# Patient Record
Sex: FEMALE | Race: WHITE | NOT HISPANIC OR LATINO
[De-identification: names, ages, dates, MRNs, and addresses within clinical notes are randomized per-mention and may not be internally consistent; named-entity substitution may affect disease eponyms.]

---

## 2017-03-29 ENCOUNTER — TRANSCRIPTION ENCOUNTER (OUTPATIENT)
Age: 27
End: 2017-03-29

## 2017-10-11 ENCOUNTER — TRANSCRIPTION ENCOUNTER (OUTPATIENT)
Age: 27
End: 2017-10-11

## 2020-12-10 ENCOUNTER — INPATIENT (INPATIENT)
Facility: HOSPITAL | Age: 30
LOS: 2 days | Discharge: HOME | DRG: 98 | End: 2020-12-13
Attending: INTERNAL MEDICINE | Admitting: HOSPITALIST
Payer: COMMERCIAL

## 2020-12-10 VITALS
HEIGHT: 67 IN | OXYGEN SATURATION: 100 % | TEMPERATURE: 100 F | SYSTOLIC BLOOD PRESSURE: 91 MMHG | HEART RATE: 110 BPM | RESPIRATION RATE: 20 BRPM | WEIGHT: 156.97 LBS | DIASTOLIC BLOOD PRESSURE: 56 MMHG

## 2020-12-10 DIAGNOSIS — G03.9 MENINGITIS, UNSPECIFIED: ICD-10-CM

## 2020-12-10 LAB
ALBUMIN SERPL ELPH-MCNC: 4.2 G/DL — SIGNIFICANT CHANGE UP (ref 3.3–5.2)
ALP SERPL-CCNC: 37 U/L — LOW (ref 40–120)
ALT FLD-CCNC: 12 U/L — SIGNIFICANT CHANGE UP
ANION GAP SERPL CALC-SCNC: 15 MMOL/L — SIGNIFICANT CHANGE UP (ref 5–17)
ANISOCYTOSIS BLD QL: SLIGHT — SIGNIFICANT CHANGE UP
APPEARANCE CSF: CLEAR — SIGNIFICANT CHANGE UP
APPEARANCE SPUN FLD: COLORLESS — SIGNIFICANT CHANGE UP
APPEARANCE UR: CLEAR — SIGNIFICANT CHANGE UP
AST SERPL-CCNC: 22 U/L — SIGNIFICANT CHANGE UP
BACTERIA # UR AUTO: ABNORMAL
BASOPHILS # BLD AUTO: 0 K/UL — SIGNIFICANT CHANGE UP (ref 0–0.2)
BASOPHILS NFR BLD AUTO: 0 % — SIGNIFICANT CHANGE UP (ref 0–2)
BILIRUB SERPL-MCNC: 0.7 MG/DL — SIGNIFICANT CHANGE UP (ref 0.4–2)
BILIRUB UR-MCNC: NEGATIVE — SIGNIFICANT CHANGE UP
BUN SERPL-MCNC: 17 MG/DL — SIGNIFICANT CHANGE UP (ref 8–20)
CALCIUM SERPL-MCNC: 9.1 MG/DL — SIGNIFICANT CHANGE UP (ref 8.6–10.2)
CHLORIDE SERPL-SCNC: 99 MMOL/L — SIGNIFICANT CHANGE UP (ref 98–107)
CO2 SERPL-SCNC: 21 MMOL/L — LOW (ref 22–29)
COLOR CSF: SIGNIFICANT CHANGE UP
COLOR SPEC: YELLOW — SIGNIFICANT CHANGE UP
CREAT SERPL-MCNC: 0.81 MG/DL — SIGNIFICANT CHANGE UP (ref 0.5–1.3)
D DIMER BLD IA.RAPID-MCNC: 153 NG/ML DDU — SIGNIFICANT CHANGE UP
DIFF PNL FLD: ABNORMAL
EBV EA AB SER IA-ACNC: <5 U/ML — SIGNIFICANT CHANGE UP
EBV EA AB TITR SER IF: POSITIVE
EBV EA IGG SER-ACNC: NEGATIVE — SIGNIFICANT CHANGE UP
EBV NA IGG SER IA-ACNC: >600 U/ML — HIGH
EBV PATRN SPEC IB-IMP: SIGNIFICANT CHANGE UP
EBV VCA IGG AVIDITY SER QL IA: POSITIVE
EBV VCA IGM SER IA-ACNC: 291 U/ML — HIGH
EBV VCA IGM SER IA-ACNC: <10 U/ML — SIGNIFICANT CHANGE UP
EBV VCA IGM TITR FLD: NEGATIVE — SIGNIFICANT CHANGE UP
EOSINOPHIL # BLD AUTO: 0 K/UL — SIGNIFICANT CHANGE UP (ref 0–0.5)
EOSINOPHIL NFR BLD AUTO: 0 % — SIGNIFICANT CHANGE UP (ref 0–6)
EPI CELLS # UR: SIGNIFICANT CHANGE UP
FLU A RESULT: SIGNIFICANT CHANGE UP
FLU A RESULT: SIGNIFICANT CHANGE UP
FLUAV AG NPH QL: SIGNIFICANT CHANGE UP
FLUBV AG NPH QL: SIGNIFICANT CHANGE UP
GLUCOSE BLDC GLUCOMTR-MCNC: 111 MG/DL — HIGH (ref 70–99)
GLUCOSE CSF-MCNC: 67 MG/DLG/24H — SIGNIFICANT CHANGE UP (ref 40–70)
GLUCOSE SERPL-MCNC: 123 MG/DL — HIGH (ref 70–99)
GLUCOSE UR QL: NEGATIVE MG/DL — SIGNIFICANT CHANGE UP
HCG SERPL-ACNC: <4 MIU/ML — SIGNIFICANT CHANGE UP
HCT VFR BLD CALC: 34.6 % — SIGNIFICANT CHANGE UP (ref 34.5–45)
HGB BLD-MCNC: 11.8 G/DL — SIGNIFICANT CHANGE UP (ref 11.5–15.5)
HIV 1 & 2 AB SERPL IA.RAPID: SIGNIFICANT CHANGE UP
KETONES UR-MCNC: NEGATIVE — SIGNIFICANT CHANGE UP
LEUKOCYTE ESTERASE UR-ACNC: NEGATIVE — SIGNIFICANT CHANGE UP
LYMPHOCYTES # BLD AUTO: 0.24 K/UL — LOW (ref 1–3.3)
LYMPHOCYTES # BLD AUTO: 0.9 % — LOW (ref 13–44)
MANUAL SMEAR VERIFICATION: SIGNIFICANT CHANGE UP
MCHC RBC-ENTMCNC: 29.6 PG — SIGNIFICANT CHANGE UP (ref 27–34)
MCHC RBC-ENTMCNC: 34.1 GM/DL — SIGNIFICANT CHANGE UP (ref 32–36)
MCV RBC AUTO: 86.7 FL — SIGNIFICANT CHANGE UP (ref 80–100)
METAMYELOCYTES # FLD: 2.6 % — HIGH (ref 0–0)
MICROCYTES BLD QL: SLIGHT — SIGNIFICANT CHANGE UP
MONOCYTES # BLD AUTO: 0 K/UL — SIGNIFICANT CHANGE UP (ref 0–0.9)
MONOCYTES NFR BLD AUTO: 0 % — LOW (ref 2–14)
MONOS+MACROS NFR CSF: 1 % — LOW (ref 15–45)
MYELOCYTES NFR BLD: 0.9 % — HIGH (ref 0–0)
NEUTROPHILS # BLD AUTO: 25.74 K/UL — HIGH (ref 1.8–7.4)
NEUTROPHILS # CSF: 99 % — HIGH (ref 0–6)
NEUTROPHILS NFR BLD AUTO: 91.3 % — HIGH (ref 43–77)
NEUTS BAND # BLD: 4.3 % — SIGNIFICANT CHANGE UP (ref 0–8)
NITRITE UR-MCNC: NEGATIVE — SIGNIFICANT CHANGE UP
NRBC NFR CSF: 206 /UL — HIGH (ref 0–5)
OVALOCYTES BLD QL SMEAR: SLIGHT — SIGNIFICANT CHANGE UP
PH UR: 6 — SIGNIFICANT CHANGE UP (ref 5–8)
PLAT MORPH BLD: NORMAL — SIGNIFICANT CHANGE UP
PLATELET # BLD AUTO: 259 K/UL — SIGNIFICANT CHANGE UP (ref 150–400)
POTASSIUM SERPL-MCNC: 3.5 MMOL/L — SIGNIFICANT CHANGE UP (ref 3.5–5.3)
POTASSIUM SERPL-SCNC: 3.5 MMOL/L — SIGNIFICANT CHANGE UP (ref 3.5–5.3)
PROT CSF-MCNC: 29 MG/DL — SIGNIFICANT CHANGE UP (ref 15–45)
PROT SERPL-MCNC: 6.7 G/DL — SIGNIFICANT CHANGE UP (ref 6.6–8.7)
PROT UR-MCNC: NEGATIVE MG/DL — SIGNIFICANT CHANGE UP
RBC # BLD: 3.99 M/UL — SIGNIFICANT CHANGE UP (ref 3.8–5.2)
RBC # CSF: 1 /CMM — SIGNIFICANT CHANGE UP (ref 0–1)
RBC # FLD: 12.3 % — SIGNIFICANT CHANGE UP (ref 10.3–14.5)
RBC BLD AUTO: SIGNIFICANT CHANGE UP
RBC CASTS # UR COMP ASSIST: ABNORMAL /HPF (ref 0–4)
RSV RESULT: SIGNIFICANT CHANGE UP
RSV RNA RESP QL NAA+PROBE: SIGNIFICANT CHANGE UP
SARS-COV-2 RNA SPEC QL NAA+PROBE: SIGNIFICANT CHANGE UP
SODIUM SERPL-SCNC: 134 MMOL/L — LOW (ref 135–145)
SP GR SPEC: 1.01 — SIGNIFICANT CHANGE UP (ref 1.01–1.02)
SPECIMEN SOURCE: SIGNIFICANT CHANGE UP
TROPONIN T SERPL-MCNC: <0.01 NG/ML — SIGNIFICANT CHANGE UP (ref 0–0.06)
TUBE TYPE: SIGNIFICANT CHANGE UP
UROBILINOGEN FLD QL: NEGATIVE MG/DL — SIGNIFICANT CHANGE UP
WBC # BLD: 26.92 K/UL — HIGH (ref 3.8–10.5)
WBC # FLD AUTO: 26.92 K/UL — HIGH (ref 3.8–10.5)
WBC UR QL: SIGNIFICANT CHANGE UP

## 2020-12-10 PROCEDURE — 99223 1ST HOSP IP/OBS HIGH 75: CPT

## 2020-12-10 PROCEDURE — 70450 CT HEAD/BRAIN W/O DYE: CPT | Mod: 26

## 2020-12-10 PROCEDURE — 71046 X-RAY EXAM CHEST 2 VIEWS: CPT | Mod: 26

## 2020-12-10 PROCEDURE — 99285 EMERGENCY DEPT VISIT HI MDM: CPT

## 2020-12-10 RX ORDER — SODIUM CHLORIDE 9 MG/ML
1000 INJECTION INTRAMUSCULAR; INTRAVENOUS; SUBCUTANEOUS ONCE
Refills: 0 | Status: COMPLETED | OUTPATIENT
Start: 2020-12-10 | End: 2020-12-10

## 2020-12-10 RX ORDER — MORPHINE SULFATE 50 MG/1
2 CAPSULE, EXTENDED RELEASE ORAL EVERY 4 HOURS
Refills: 0 | Status: DISCONTINUED | OUTPATIENT
Start: 2020-12-10 | End: 2020-12-13

## 2020-12-10 RX ORDER — CEFTRIAXONE 500 MG/1
2000 INJECTION, POWDER, FOR SOLUTION INTRAMUSCULAR; INTRAVENOUS ONCE
Refills: 0 | Status: COMPLETED | OUTPATIENT
Start: 2020-12-10 | End: 2020-12-10

## 2020-12-10 RX ORDER — CABERGOLINE 0.5 MG/1
0.5 TABLET ORAL
Qty: 0 | Refills: 0 | DISCHARGE

## 2020-12-10 RX ORDER — VANCOMYCIN HCL 1 G
1250 VIAL (EA) INTRAVENOUS EVERY 12 HOURS
Refills: 0 | Status: DISCONTINUED | OUTPATIENT
Start: 2020-12-11 | End: 2020-12-11

## 2020-12-10 RX ORDER — METOCLOPRAMIDE HCL 10 MG
10 TABLET ORAL ONCE
Refills: 0 | Status: DISCONTINUED | OUTPATIENT
Start: 2020-12-10 | End: 2020-12-10

## 2020-12-10 RX ORDER — ACETAMINOPHEN 500 MG
650 TABLET ORAL EVERY 6 HOURS
Refills: 0 | Status: DISCONTINUED | OUTPATIENT
Start: 2020-12-10 | End: 2020-12-13

## 2020-12-10 RX ORDER — ONDANSETRON 8 MG/1
4 TABLET, FILM COATED ORAL ONCE
Refills: 0 | Status: COMPLETED | OUTPATIENT
Start: 2020-12-10 | End: 2020-12-10

## 2020-12-10 RX ORDER — METFORMIN HYDROCHLORIDE 850 MG/1
3 TABLET ORAL
Qty: 0 | Refills: 0 | DISCHARGE

## 2020-12-10 RX ORDER — VANCOMYCIN HCL 1 G
1000 VIAL (EA) INTRAVENOUS ONCE
Refills: 0 | Status: DISCONTINUED | OUTPATIENT
Start: 2020-12-10 | End: 2020-12-10

## 2020-12-10 RX ORDER — DIPHENHYDRAMINE HCL 50 MG
25 CAPSULE ORAL ONCE
Refills: 0 | Status: COMPLETED | OUTPATIENT
Start: 2020-12-10 | End: 2020-12-10

## 2020-12-10 RX ORDER — NYSTATIN 500MM UNIT
500000 POWDER (EA) MISCELLANEOUS
Refills: 0 | Status: DISCONTINUED | OUTPATIENT
Start: 2020-12-10 | End: 2020-12-13

## 2020-12-10 RX ORDER — VANCOMYCIN HCL 1 G
1250 VIAL (EA) INTRAVENOUS ONCE
Refills: 0 | Status: COMPLETED | OUTPATIENT
Start: 2020-12-10 | End: 2020-12-10

## 2020-12-10 RX ORDER — VANCOMYCIN HCL 1 G
VIAL (EA) INTRAVENOUS
Refills: 0 | Status: DISCONTINUED | OUTPATIENT
Start: 2020-12-10 | End: 2020-12-11

## 2020-12-10 RX ORDER — MORPHINE SULFATE 50 MG/1
4 CAPSULE, EXTENDED RELEASE ORAL ONCE
Refills: 0 | Status: DISCONTINUED | OUTPATIENT
Start: 2020-12-10 | End: 2020-12-10

## 2020-12-10 RX ORDER — KETOROLAC TROMETHAMINE 30 MG/ML
15 SYRINGE (ML) INJECTION ONCE
Refills: 0 | Status: DISCONTINUED | OUTPATIENT
Start: 2020-12-10 | End: 2020-12-10

## 2020-12-10 RX ORDER — VANCOMYCIN HCL 1 G
VIAL (EA) INTRAVENOUS
Refills: 0 | Status: DISCONTINUED | OUTPATIENT
Start: 2020-12-10 | End: 2020-12-10

## 2020-12-10 RX ORDER — ONDANSETRON 8 MG/1
4 TABLET, FILM COATED ORAL EVERY 6 HOURS
Refills: 0 | Status: DISCONTINUED | OUTPATIENT
Start: 2020-12-10 | End: 2020-12-13

## 2020-12-10 RX ORDER — METOCLOPRAMIDE HCL 10 MG
10 TABLET ORAL ONCE
Refills: 0 | Status: COMPLETED | OUTPATIENT
Start: 2020-12-10 | End: 2020-12-10

## 2020-12-10 RX ORDER — CEFTRIAXONE 500 MG/1
2000 INJECTION, POWDER, FOR SOLUTION INTRAMUSCULAR; INTRAVENOUS EVERY 12 HOURS
Refills: 0 | Status: DISCONTINUED | OUTPATIENT
Start: 2020-12-10 | End: 2020-12-11

## 2020-12-10 RX ORDER — ACYCLOVIR SODIUM 500 MG
700 VIAL (EA) INTRAVENOUS EVERY 8 HOURS
Refills: 0 | Status: DISCONTINUED | OUTPATIENT
Start: 2020-12-10 | End: 2020-12-11

## 2020-12-10 RX ORDER — SODIUM CHLORIDE 9 MG/ML
1000 INJECTION, SOLUTION INTRAVENOUS ONCE
Refills: 0 | Status: COMPLETED | OUTPATIENT
Start: 2020-12-10 | End: 2020-12-10

## 2020-12-10 RX ADMIN — Medication 166.67 MILLIGRAM(S): at 18:46

## 2020-12-10 RX ADMIN — SODIUM CHLORIDE 1000 MILLILITER(S): 9 INJECTION, SOLUTION INTRAVENOUS at 18:49

## 2020-12-10 RX ADMIN — ONDANSETRON 4 MILLIGRAM(S): 8 TABLET, FILM COATED ORAL at 08:28

## 2020-12-10 RX ADMIN — Medication 25 MILLIGRAM(S): at 11:16

## 2020-12-10 RX ADMIN — Medication 15 MILLIGRAM(S): at 11:19

## 2020-12-10 RX ADMIN — MORPHINE SULFATE 2 MILLIGRAM(S): 50 CAPSULE, EXTENDED RELEASE ORAL at 19:02

## 2020-12-10 RX ADMIN — ONDANSETRON 4 MILLIGRAM(S): 8 TABLET, FILM COATED ORAL at 21:06

## 2020-12-10 RX ADMIN — Medication 500000 UNIT(S): at 20:25

## 2020-12-10 RX ADMIN — CEFTRIAXONE 100 MILLIGRAM(S): 500 INJECTION, POWDER, FOR SOLUTION INTRAMUSCULAR; INTRAVENOUS at 20:25

## 2020-12-10 RX ADMIN — MORPHINE SULFATE 4 MILLIGRAM(S): 50 CAPSULE, EXTENDED RELEASE ORAL at 13:50

## 2020-12-10 RX ADMIN — Medication 114 MILLIGRAM(S): at 22:01

## 2020-12-10 RX ADMIN — MORPHINE SULFATE 4 MILLIGRAM(S): 50 CAPSULE, EXTENDED RELEASE ORAL at 14:20

## 2020-12-10 RX ADMIN — SODIUM CHLORIDE 1000 MILLILITER(S): 9 INJECTION INTRAMUSCULAR; INTRAVENOUS; SUBCUTANEOUS at 13:50

## 2020-12-10 RX ADMIN — Medication 10 MILLIGRAM(S): at 08:27

## 2020-12-10 RX ADMIN — CEFTRIAXONE 100 MILLIGRAM(S): 500 INJECTION, POWDER, FOR SOLUTION INTRAMUSCULAR; INTRAVENOUS at 14:52

## 2020-12-10 RX ADMIN — SODIUM CHLORIDE 1000 MILLILITER(S): 9 INJECTION INTRAMUSCULAR; INTRAVENOUS; SUBCUTANEOUS at 08:28

## 2020-12-10 RX ADMIN — MORPHINE SULFATE 2 MILLIGRAM(S): 50 CAPSULE, EXTENDED RELEASE ORAL at 19:28

## 2020-12-10 RX ADMIN — Medication 15 MILLIGRAM(S): at 11:50

## 2020-12-10 NOTE — ED STATDOCS - ATTENDING CONTRIBUTION TO CARE
30 YOF PMHx of insulin resistance on metformin, pituitary microadenoma followed by neurosurg/endocrine here for multiple complaints. Started on fluconazole yesterday by PCP for possible thrush x 1.5 weeks. After taking medication, patient felt flushed and vomited. Took benadryl with some relief but overnight started developing headaches. At first similar to migraines but then started to worsen. She recorded a temperature of 101 at home. This morning patient also with pleuritic chest pain. Tested negative for COVID19 recently.   AP - non meningeal on exam. ddimer negative and ekg nonischemic. after multiple rounds of pain meds, patient with only mild relief of headache and with elevated wbc to 27k, will do LP eval for meningitis. patient endorsing finding a possible tick on her a few weeks ago. will start abx, and admit.

## 2020-12-10 NOTE — ED ADULT NURSE REASSESSMENT NOTE - NS ED NURSE REASSESS COMMENT FT1
Pt awake reporting HA as returned and worsening findings reported to ACP and ED MD. Awaiting further orders.

## 2020-12-10 NOTE — ED ADULT TRIAGE NOTE - CHIEF COMPLAINT QUOTE
Pt took first dose of diflucan yesterday for oral thrush, states face and eyes became red, ate dinner and took prescribed metformin, began vomiting x 3 and headache/fever of 101.8, last took tylenol 230 am, tested for covid friday with negative result

## 2020-12-10 NOTE — ED STATDOCS - EYES, MLM
clear bilaterally.  Pupils equal, round, and reactive to light. EOMI clear bilaterally.  Pupils equal, round, and reactive to light. EOMI, non meningeal

## 2020-12-10 NOTE — ED STATDOCS - OBJECTIVE STATEMENT
31 y/o F with PMHx of insulin resistance, pituitary micro adenoma presents to the ED for multiple complaints. Pt states that she had oral thrush x2 days ago and had her first dose Diflucan yesterday and then she had flush in her face, vomit x3, fever (101.8). Pt then took benadryl and felt better. Pt had x1 week of headache which seemed positional with some photo sensitivity with it. This morning pt woke up with pleuritic CP, no history of DVT or PE. Pt on Metformin   Denies visual changes, diarrhea

## 2020-12-10 NOTE — ED ADULT NURSE REASSESSMENT NOTE - NS ED NURSE REASSESS COMMENT FT1
MD at pt bedside at this time, POC discussed with pt, pt verbalize understanding of plan and agrees, pt awaiting bed assignment at this time, safety and comfort maintained. Awaiting further orders.

## 2020-12-10 NOTE — ED STATDOCS - PROGRESS NOTE DETAILS
Patient notes improvement of headache with medication administration.   She reports in mid November she had a BV and yeast infection and was prescribed metrogel and diflucan that she took and had no reaction. She reports continued chest tightness that's worse when she takes a deep breath and dry mouth. patient reassessed, feeling that headache has dulled. still pending CTH and UA. elevated WBC noted, patient says chest pain improved, no abd pain. patient reassessed, feeling that headache has dulled. no meningeal signs. still pending CTH and UA. elevated WBC noted, patient says chest pain improved, no abd pain. Patient reassessed, notes headache has worsened, requesting medication.  Patient offered lumbar puncture, she would like to see if the medication she is getting for her headache might help before going through with lumbar puncture.

## 2020-12-10 NOTE — H&P ADULT - NSHPSOCIALHISTORY_GEN_ALL_CORE
Denied tobacco, alcohol, or illicit drug use  Lives at home by herself  PMH: Insulin resistance, Pituitary microadenoma  PSH: Denied  Family History: Father with hypertension, No history of diabetes

## 2020-12-10 NOTE — ED ADULT NURSE REASSESSMENT NOTE - NS ED NURSE REASSESS COMMENT FT1
Pt resting comfortably on stretcher, laying in flat comfortable position, safety and comfort maintained.

## 2020-12-10 NOTE — ED ADULT NURSE REASSESSMENT NOTE - NS ED NURSE REASSESS COMMENT FT1
Pt reproting HA as returned and worsening, medicaiton order to be administered at this time, lights dimmed for additional comfort, pt resting in recliner, safety measures maintained.

## 2020-12-10 NOTE — H&P ADULT - NSHPPHYSICALEXAM_GEN_ALL_CORE
Vital Signs Last 24 Hrs  T(C): 37.5 (10 Dec 2020 07:41), Max: 37.5 (10 Dec 2020 07:41)  T(F): 99.5 (10 Dec 2020 07:41), Max: 99.5 (10 Dec 2020 07:41)  HR: 110 (10 Dec 2020 07:41) (110 - 110)  BP: 91/56 (10 Dec 2020 07:41) (91/56 - 91/56)  BP(mean): --  RR: 20 (10 Dec 2020 07:41) (20 - 20)  SpO2: 100% (10 Dec 2020 07:41) (100% - 100%)    General appearance: No acute distress, Awake, Alert  HEENT: Normocephalic, Atraumatic, Conjunctiva clear, EOMI, Oral thrush  Neck: No JVD, No tenderness, Nuchal rigidity  Lungs: Breath sound equal bilaterally, No wheezes, No rales  Cardiovascular: S1S2, Regular rhythm  Abdomen: Soft, Nontender, Nondistended, No guarding/rebound, Positive bowel sounds  Extremities: No clubbing, No cyanosis, No edema, No calf tenderness  Neuro: Strength equal bilaterally, No tremors  Psychiatric: Appropriate mood, Normal affect

## 2020-12-10 NOTE — H&P ADULT - HISTORY OF PRESENT ILLNESS
30F who presented with headache and dizziness. She also had some sensation of photophobia. She reports that she had recently developing oral thrush and was prescribed treatment by her primary care physician but had not taken the medication as of yet. She also reported previously being treated with azithromycin for chlamydia two months ago. She also developed bacterial vaginosis and a yeast infection afterwards with metronidazole and fluconazole. She developed a fever at home of 101.8 but denied any chills. The patient also reported seeing a tick after hiking but was not bitten. On presentation to the emergency department, the patient had continued headache despite analgesic medications and a lumbar puncture was obtained with an elevated opening pressure and elevated neutrophil count.

## 2020-12-10 NOTE — ED ADULT NURSE REASSESSMENT NOTE - NS ED NURSE REASSESS COMMENT FT1
Pt medicated as ordered, tolerated well, laying flat on stretcher, reports comfort, awaiting test results, verbalize understanding and agree, safety and comfort measures maintained,

## 2020-12-10 NOTE — ED ADULT NURSE REASSESSMENT NOTE - NS ED NURSE REASSESS COMMENT FT1
Pt arasht returned back from testing, informed awaiting test results at this time, pt verbalize understanding and agrees with plan.

## 2020-12-10 NOTE — H&P ADULT - ASSESSMENT
30F with fevers, headache, and nausea at home found to have leukocytosis and lumbar puncture with elevated opening pressure and neutrophil count.    Meningitis - CT of the head was without acute intracranial pathology. Lumbar puncture with elevated opening pressure(49), elevated neutrophil count. Gram stain was without organisms. Discussed with Infectious Disease, to continue on empiric antibiotics and acyclovir until the remainder of the serologies have resulted. HIV screen was negative. Analgesic medications as needed.    Thrush - Oral nystatin.    Insulin resistance - The patient was on metformin at home. To monitor glucose levels. Consistent carbohydrate diet.    Pituitary microadenoma - The patient was on cabergoline twice a week. Will need to take home medication if non-formulary.

## 2020-12-11 LAB
ANION GAP SERPL CALC-SCNC: 9 MMOL/L — SIGNIFICANT CHANGE UP (ref 5–17)
BUN SERPL-MCNC: 16 MG/DL — SIGNIFICANT CHANGE UP (ref 8–20)
CALCIUM SERPL-MCNC: 7.8 MG/DL — LOW (ref 8.6–10.2)
CHLORIDE SERPL-SCNC: 101 MMOL/L — SIGNIFICANT CHANGE UP (ref 98–107)
CO2 SERPL-SCNC: 22 MMOL/L — SIGNIFICANT CHANGE UP (ref 22–29)
CREAT SERPL-MCNC: 0.63 MG/DL — SIGNIFICANT CHANGE UP (ref 0.5–1.3)
GLUCOSE SERPL-MCNC: 108 MG/DL — HIGH (ref 70–99)
HCT VFR BLD CALC: 31.6 % — LOW (ref 34.5–45)
HGB BLD-MCNC: 10.6 G/DL — LOW (ref 11.5–15.5)
LABORATORY COMMENT REPORT: SIGNIFICANT CHANGE UP
MCHC RBC-ENTMCNC: 29.3 PG — SIGNIFICANT CHANGE UP (ref 27–34)
MCHC RBC-ENTMCNC: 33.5 GM/DL — SIGNIFICANT CHANGE UP (ref 32–36)
MCV RBC AUTO: 87.3 FL — SIGNIFICANT CHANGE UP (ref 80–100)
PLATELET # BLD AUTO: 224 K/UL — SIGNIFICANT CHANGE UP (ref 150–400)
POTASSIUM SERPL-MCNC: 3.7 MMOL/L — SIGNIFICANT CHANGE UP (ref 3.5–5.3)
POTASSIUM SERPL-SCNC: 3.7 MMOL/L — SIGNIFICANT CHANGE UP (ref 3.5–5.3)
PROCALCITONIN SERPL-MCNC: 2.82 NG/ML — HIGH (ref 0.02–0.1)
RBC # BLD: 3.62 M/UL — LOW (ref 3.8–5.2)
RBC # FLD: 12.7 % — SIGNIFICANT CHANGE UP (ref 10.3–14.5)
SODIUM SERPL-SCNC: 132 MMOL/L — LOW (ref 135–145)
SOURCE HSV 1/2: SIGNIFICANT CHANGE UP
WBC # BLD: 13.5 K/UL — HIGH (ref 3.8–10.5)
WBC # FLD AUTO: 13.5 K/UL — HIGH (ref 3.8–10.5)

## 2020-12-11 PROCEDURE — 99223 1ST HOSP IP/OBS HIGH 75: CPT

## 2020-12-11 PROCEDURE — 99232 SBSQ HOSP IP/OBS MODERATE 35: CPT

## 2020-12-11 RX ORDER — SODIUM CHLORIDE 9 MG/ML
1000 INJECTION INTRAMUSCULAR; INTRAVENOUS; SUBCUTANEOUS
Refills: 0 | Status: DISCONTINUED | OUTPATIENT
Start: 2020-12-11 | End: 2020-12-12

## 2020-12-11 RX ORDER — CEFTRIAXONE 500 MG/1
2000 INJECTION, POWDER, FOR SOLUTION INTRAMUSCULAR; INTRAVENOUS EVERY 12 HOURS
Refills: 0 | Status: DISCONTINUED | OUTPATIENT
Start: 2020-12-11 | End: 2020-12-11

## 2020-12-11 RX ORDER — VANCOMYCIN HCL 1 G
1000 VIAL (EA) INTRAVENOUS EVERY 8 HOURS
Refills: 0 | Status: DISCONTINUED | OUTPATIENT
Start: 2020-12-11 | End: 2020-12-12

## 2020-12-11 RX ORDER — CEFTRIAXONE 500 MG/1
2000 INJECTION, POWDER, FOR SOLUTION INTRAMUSCULAR; INTRAVENOUS EVERY 12 HOURS
Refills: 0 | Status: DISCONTINUED | OUTPATIENT
Start: 2020-12-11 | End: 2020-12-12

## 2020-12-11 RX ORDER — INFLUENZA VIRUS VACCINE 15; 15; 15; 15 UG/.5ML; UG/.5ML; UG/.5ML; UG/.5ML
0.5 SUSPENSION INTRAMUSCULAR ONCE
Refills: 0 | Status: DISCONTINUED | OUTPATIENT
Start: 2020-12-11 | End: 2020-12-13

## 2020-12-11 RX ADMIN — Medication 500000 UNIT(S): at 21:32

## 2020-12-11 RX ADMIN — Medication 650 MILLIGRAM(S): at 02:40

## 2020-12-11 RX ADMIN — ONDANSETRON 4 MILLIGRAM(S): 8 TABLET, FILM COATED ORAL at 17:49

## 2020-12-11 RX ADMIN — Medication 114 MILLIGRAM(S): at 07:00

## 2020-12-11 RX ADMIN — Medication 250 MILLIGRAM(S): at 21:31

## 2020-12-11 RX ADMIN — SODIUM CHLORIDE 125 MILLILITER(S): 9 INJECTION INTRAMUSCULAR; INTRAVENOUS; SUBCUTANEOUS at 11:49

## 2020-12-11 RX ADMIN — Medication 166.67 MILLIGRAM(S): at 06:19

## 2020-12-11 RX ADMIN — ONDANSETRON 4 MILLIGRAM(S): 8 TABLET, FILM COATED ORAL at 11:49

## 2020-12-11 RX ADMIN — CEFTRIAXONE 100 MILLIGRAM(S): 500 INJECTION, POWDER, FOR SOLUTION INTRAMUSCULAR; INTRAVENOUS at 09:34

## 2020-12-11 RX ADMIN — Medication 650 MILLIGRAM(S): at 02:17

## 2020-12-11 RX ADMIN — MORPHINE SULFATE 2 MILLIGRAM(S): 50 CAPSULE, EXTENDED RELEASE ORAL at 11:40

## 2020-12-11 RX ADMIN — Medication 250 MILLIGRAM(S): at 16:19

## 2020-12-11 RX ADMIN — CEFTRIAXONE 100 MILLIGRAM(S): 500 INJECTION, POWDER, FOR SOLUTION INTRAMUSCULAR; INTRAVENOUS at 17:45

## 2020-12-11 RX ADMIN — Medication 500000 UNIT(S): at 17:45

## 2020-12-11 RX ADMIN — SODIUM CHLORIDE 125 MILLILITER(S): 9 INJECTION INTRAMUSCULAR; INTRAVENOUS; SUBCUTANEOUS at 17:48

## 2020-12-11 RX ADMIN — Medication 500000 UNIT(S): at 05:11

## 2020-12-11 NOTE — PROGRESS NOTE ADULT - SUBJECTIVE AND OBJECTIVE BOX
GEOFF RDZBHUPIDNER  ----------------------------------------  The patient was seen and evaluated for suspected meningitis. Reports feeling better today.    Vital Signs Last 24 Hrs  T(C): 37.2 (11 Dec 2020 10:00), Max: 37.2 (10 Dec 2020 18:05)  T(F): 98.9 (11 Dec 2020 10:00), Max: 99 (10 Dec 2020 18:05)  HR: 91 (11 Dec 2020 10:00) (83 - 93)  BP: 114/65 (11 Dec 2020 10:00) (92/62 - 114/65)  BP(mean): --  RR: 18 (11 Dec 2020 10:00) (18 - 18)  SpO2: 100% (11 Dec 2020 10:00) (100% - 100%)    CAPILLARY BLOOD GLUCOSE  POCT Blood Glucose.: 111 mg/dL (10 Dec 2020 19:10)    PHYSICAL EXAMINATION:  ----------------------------------------  General appearance: No acute distress, Awake, Alert  HEENT: Normocephalic, Atraumatic, Conjunctiva clear, EOMI, Oral thrush  Neck: Supple, No JVD, No tenderness  Lungs: Breath sound equal bilaterally, No wheezes, No rales  Cardiovascular: S1S2, Regular rhythm  Abdomen: Soft, Nontender, Nondistended, No guarding/rebound, Positive bowel sounds  Extremities: No clubbing, No cyanosis, No calf tenderness, Mild peripheral edema  Neuro: Strength equal bilaterally, No tremors  Psychiatric: Appropriate mood, Normal affect    LABORATORY STUDIES:  ----------------------------------------             10.6   13.50 )-----------( 224      ( 11 Dec 2020 07:45 )             31.6     12-11    132<L>  |  101  |  16.0  ----------------------------<  108<H>  3.7   |  22.0  |  0.63    Ca    7.8<L>      11 Dec 2020 07:45    TPro  6.7  /  Alb  4.2  /  TBili  0.7  /  DBili  x   /  AST  22  /  ALT  12  /  AlkPhos  37<L>  12-10    LIVER FUNCTIONS - ( 10 Dec 2020 08:26 )  Alb: 4.2 g/dL / Pro: 6.7 g/dL / ALK PHOS: 37 U/L / ALT: 12 U/L / AST: 22 U/L / GGT: x           CARDIAC MARKERS ( 10 Dec 2020 08:26 )  x     / <0.01 ng/mL / x     / x     / x        Urinalysis Basic - ( 10 Dec 2020 11:37 )  Color: Yellow / Appearance: Clear / S.010 / pH: x  Gluc: x / Ketone: Negative  / Bili: Negative / Urobili: Negative mg/dL   Blood: x / Protein: Negative mg/dL / Nitrite: Negative   Leuk Esterase: Negative / RBC: 3-5 /HPF / WBC 0-2   Sq Epi: x / Non Sq Epi: Occasional / Bacteria: Occasional    Culture - CSF with Gram Stain (collected 10 Dec 2020 21:56)  Source: .CSF CSF  Gram Stain (10 Dec 2020 22:22):    No polymorphonuclear cells seen per low power field    No organisms seen per oil power field    by cytocentrifuge    MEDICATIONS  (STANDING):  acyclovir IVPB 700 milliGRAM(s) IV Intermittent every 8 hours  cefTRIAXone   IVPB 2000 milliGRAM(s) IV Intermittent every 12 hours  nystatin    Suspension 088151 Unit(s) Oral four times a day  sodium chloride 0.9%. 1000 milliLiter(s) (125 mL/Hr) IV Continuous <Continuous>  vancomycin  IVPB      vancomycin  IVPB 1250 milliGRAM(s) IV Intermittent every 12 hours    MEDICATIONS  (PRN):  acetaminophen   Tablet .. 650 milliGRAM(s) Oral every 6 hours PRN Temp greater or equal to 38C (100.4F), Mild Pain (1 - 3)  morphine  - Injectable 2 milliGRAM(s) IV Push every 4 hours PRN Moderate Pain (4 - 6)  ondansetron Injectable 4 milliGRAM(s) IV Push every 6 hours PRN Nausea and/or Vomiting      ASSESSMENT / PLAN:  ----------------------------------------  30F with fevers, headache, and nausea at home found to have leukocytosis and lumbar puncture with elevated opening pressure and neutrophil count. Empiric antibiotics and acyclovir were initiated for meningitis.    Meningitis - CT of the head was without acute intracranial pathology. Lumbar puncture with elevated opening pressure(49) and elevated neutrophil count. Gram stain was without organisms. Discussed with Infectious Disease, to continue on empiric antibiotics and acyclovir until the remainder of the serologies have resulted. HIV screen was negative. Clinically improved with resolution of nuchal rigidity. Tolerating oral intake.    Thrush - Oral nystatin.    Insulin resistance - The patient was on metformin at home. To monitor glucose levels. On a consistent carbohydrate diet.    Pituitary microadenoma - The patient was on cabergoline twice a week. Held for now.    Discussed with the patient and her sister on the telephone. Test results, plan of care, and recommendations from Infectious Disease discussed.    Discussed with the patient's primary care physician Dr. Adrian Lombardi ([phone]).

## 2020-12-11 NOTE — CONSULT NOTE ADULT - SUBJECTIVE AND OBJECTIVE BOX
[facility]  INFECTIOUS DISEASES AND INTERNAL MEDICINE at [place]  =======================================================  Davon Presley MD  Diplomates American Board of Internal Medicine and Infectious Diseases  Tel: [phone]      Fax: [phone]  =======================================================      [id number]  GEOFF FOWLER    CC: Patient is a 30y old  Female who presents with a chief complaint of Fever     30y  Female presented with headache and dizziness. She also had some sensation of photophobia. She reports that she had recently developing oral thrush and was prescribed treatment by her primary care physician but had not taken the medication as of yet. She also reported previously being treated with azithromycin for chlamydia two months ago. She also developed bacterial vaginosis and a yeast infection afterwards and was treated with metronidazole and fluconazole. She developed a fever at home of 101.8 but denied any chills associated with headache. On presentation to the emergency department, the patient had continued headache despite analgesic medications and a lumbar puncture was obtained with an elevated opening pressure and elevated neutrophil count. ID input requested.       Past Medical & Surgical Hx:  Hyperglycemia   No surgical history       Social Hx:  Denies smoking. Occasional ETOH       FAMILY HISTORY:  HTN - Father      Allergies  No Known Allergies       REVIEW OF SYSTEMS:  CONSTITUTIONAL:  No Fever or chills  HEENT:  No diplopia or blurred vision.  No earache, sore throat or runny nose.  CARDIOVASCULAR:  No pressure, squeezing, strangling, tightness, heaviness or aching about the chest, neck, axilla or epigastrium.  RESPIRATORY:  No cough, shortness of breath  GASTROINTESTINAL:  No nausea, vomiting or diarrhea.  GENITOURINARY:  No dysuria, frequency or urgency.   MUSCULOSKELETAL:  no joint aches, no muscle pain  SKIN:  No change in skin, hair or nails.  NEUROLOGIC:  No Headaches, seizures   PSYCHIATRIC:  No disorder of thought or mood.  ENDOCRINE:  No heat or cold intolerance  HEMATOLOGICAL:  No easy bruising or bleeding.       Physical Exam:  GEN: NAD, pleasant  HEENT: normocephalic and atraumatic. EOMI. PERRL.  Anicteric  NECK: Supple.   LUNGS: Clear to auscultation.  HEART: Regular rate and rhythm without murmur.  ABDOMEN: Soft, nontender, and nondistended.  Positive bowel sounds.    : No CVA tenderness  EXTREMITIES: Without any edema.  MSK: No joint swelling  NEUROLOGIC:  No Focal Deficits  PSYCHIATRIC: Appropriate affect .  SKIN: No Rash        Vitals:  T(F): 98.9 (11 Dec 2020 10:00), Max: 99 (10 Dec 2020 18:05)  HR: 91 (11 Dec 2020 10:00)  BP: 114/65 (11 Dec 2020 10:00)  RR: 18 (11 Dec 2020 10:00)  SpO2: 100% (11 Dec 2020 10:00) (100% - 100%)  temp max in last 48H T(F): , Max: 99.5 (12-10-20 @ 07:41)      Current Antibiotics:  acyclovir IVPB 700 milliGRAM(s) IV Intermittent every 8 hours  cefTRIAXone   IVPB 2000 milliGRAM(s) IV Intermittent every 12 hours  nystatin    Suspension 892332 Unit(s) Oral four times a day  vancomycin  IVPB      vancomycin  IVPB 1250 milliGRAM(s) IV Intermittent every 12 hours    Other medications:  sodium chloride 0.9%. 1000 milliLiter(s) IV Continuous <Continuous>        Labs:                        10.6   13.50 )-----------( 224      ( 11 Dec 2020 07:45 )             31.6      12-    132<L>  |  101  |  16.0  ----------------------------<  108<H>  3.7   |  22.0  |  0.63    Ca    7.8<L>      11 Dec 2020 07:45    TPro  6.7  /  Alb  4.2  /  TBili  0.7  /  DBili  x   /  AST  22  /  ALT  12  /  AlkPhos  37<L>  1210      Culture - CSF with Gram Stain (collected 12-10-20 @ 21:56)  Source: .CSF CSF  Gram Stain (12-10-20 @ 22:22):    No polymorphonuclear cells seen per low power field    No organisms seen per oil power field    by cytocentrifuge      WBC Count: 13.50 K/uL (20 @ 07:45)  WBC Count: 26.92 K/uL (12-10-20 @ 08:26)    Creatinine, Serum: 0.63 mg/dL (20 @ 07:45)  Creatinine, Serum: 0.81 mg/dL (12-10-20 @ 08:26)    COVID-19 PCR: NotDetec (12-10-20 @ 09:34)    Urinalysis Basic - ( 10 Dec 2020 11:37 )    Color: Yellow / Appearance: Clear / S.010 / pH: x  Gluc: x / Ketone: Negative  / Bili: Negative / Urobili: Negative mg/dL   Blood: x / Protein: Negative mg/dL / Nitrite: Negative   Leuk Esterase: Negative / RBC: 3-5 /HPF / WBC 0-2   Sq Epi: x / Non Sq Epi: Occasional / Bacteria: Occasional          < from: CT Head No Cont (12.10.20 @ 09:53) >   EXAM:  CT BRAIN                          PROCEDURE DATE:  12/10/2020        INTERPRETATION:  CT OF THE HEAD WITHOUT CONTRAST    CLINICAL INDICATION: 8. History of pituitary adenoma    TECHNIQUE: Volumetric CT acquisition was performed through the brain and reviewed using brain and bone window technique. Dose optimization techniques were utilized including kVp/mA modulation along with iterative reconstructions.      COMPARISON: No prior studies have been submitted for comparison.    FINDINGS:    The ventricular and sulcal size and configuration is age appropriate.   There is no acute loss of gray-white differentiation.    There is no evidence of mass effect, midline shift, acute intracranial hemorrhage, or extra-axial collections.     Thecalvarium is intact. The paranasal sinuses are clear.The mastoid air cells are predominantly clear. The orbits are unremarkable.      IMPRESSION:  No acute intracranial hemorrhage or acute territorial infarction.    < end of copied text >

## 2020-12-11 NOTE — CONSULT NOTE ADULT - ASSESSMENT
30y  Female presented with headache and dizziness. She also had some sensation of photophobia. She reports that she had recently developing oral thrush and was prescribed treatment by her primary care physician but had not taken the medication as of yet. She also reported previously being treated with azithromycin for chlamydia two months ago. She also developed bacterial vaginosis and a yeast infection afterwards and was treated with metronidazole and fluconazole. She developed a fever at home of 101.8 but denied any chills associated with headache. On presentation to the emergency department, the patient had continued headache despite analgesic medications and a lumbar puncture was obtained with an elevated opening pressure and elevated neutrophil count.       Likely Aseptic meningitis   r/o STDs  Fever      - Blood cultures pending  - Repeat blood cultures if febrile   - COVID 19 PCR negative  - Influenza negative   - CT Head no acute findings  - LP results reviewed, HSV CSF PCR negative, CSF culture gram stain negative   - UA negative for UTI  - Procalcitonin level ordered  - Check HIV VL  - Check urine GC/Chlamydia   - Syphilis screen   - Continue Ceftriaxone  - Continue Vancomycin  - Monitor trough  - Monitor for Vancomycin toxicity   - patient remains critically ill with ongoing MRSA concern cultures are pending  - D/C Acyclovir   - Trend Fever  - Trend Leukocytosis      Will Follow   30y  Female presented with headache and dizziness. She also had some sensation of photophobia. She reports that she had recently developing oral thrush and was prescribed treatment by her primary care physician but had not taken the medication as of yet. She also reported previously being treated with azithromycin for chlamydia two months ago. She also developed bacterial vaginosis and a yeast infection afterwards and was treated with metronidazole and fluconazole. She developed a fever at home of 101.8 but denied any chills associated with headache. On presentation to the emergency department, the patient had continued headache despite analgesic medications and a lumbar puncture was obtained with an elevated opening pressure and elevated neutrophil count.       Likely Aseptic meningitis   r/o STDs  Fever  oral thrush      - Blood cultures pending  - Repeat blood cultures if febrile   - COVID 19 PCR negative  - Influenza negative   - CT Head no acute findings  - LP results reviewed, HSV CSF PCR negative, CSF culture gram stain negative   - UA negative for UTI  - Procalcitonin level ordered  - Check HIV VL  - Check urine GC/Chlamydia   - Syphilis screen   - Continue Ceftriaxone  - Continue Nystatin   - Continue Vancomycin  - Monitor trough  - Monitor for Vancomycin toxicity   - patient remains critically ill with ongoing MRSA concern cultures are pending  - D/C Acyclovir   - Trend Fever  - Trend Leukocytosis      Will Follow

## 2020-12-12 LAB
ANION GAP SERPL CALC-SCNC: 9 MMOL/L — SIGNIFICANT CHANGE UP (ref 5–17)
BUN SERPL-MCNC: 6 MG/DL — LOW (ref 8–20)
CALCIUM SERPL-MCNC: 8.6 MG/DL — SIGNIFICANT CHANGE UP (ref 8.6–10.2)
CHLORIDE SERPL-SCNC: 110 MMOL/L — HIGH (ref 98–107)
CO2 SERPL-SCNC: 21 MMOL/L — LOW (ref 22–29)
CREAT SERPL-MCNC: 0.5 MG/DL — SIGNIFICANT CHANGE UP (ref 0.5–1.3)
GLUCOSE SERPL-MCNC: 94 MG/DL — SIGNIFICANT CHANGE UP (ref 70–99)
HCT VFR BLD CALC: 30.1 % — LOW (ref 34.5–45)
HGB BLD-MCNC: 10.1 G/DL — LOW (ref 11.5–15.5)
HIV-1 VIRAL LOAD RESULT: SIGNIFICANT CHANGE UP
HIV1 RNA # SERPL NAA+PROBE: SIGNIFICANT CHANGE UP
HIV1 RNA SER-IMP: SIGNIFICANT CHANGE UP
HIV1 RNA SERPL NAA+PROBE-ACNC: SIGNIFICANT CHANGE UP
HIV1 RNA SERPL NAA+PROBE-LOG#: SIGNIFICANT CHANGE UP LG COP/ML
MCHC RBC-ENTMCNC: 29.3 PG — SIGNIFICANT CHANGE UP (ref 27–34)
MCHC RBC-ENTMCNC: 33.6 GM/DL — SIGNIFICANT CHANGE UP (ref 32–36)
MCV RBC AUTO: 87.2 FL — SIGNIFICANT CHANGE UP (ref 80–100)
PLATELET # BLD AUTO: 177 K/UL — SIGNIFICANT CHANGE UP (ref 150–400)
POTASSIUM SERPL-MCNC: 3.8 MMOL/L — SIGNIFICANT CHANGE UP (ref 3.5–5.3)
POTASSIUM SERPL-SCNC: 3.8 MMOL/L — SIGNIFICANT CHANGE UP (ref 3.5–5.3)
RBC # BLD: 3.45 M/UL — LOW (ref 3.8–5.2)
RBC # FLD: 12.8 % — SIGNIFICANT CHANGE UP (ref 10.3–14.5)
SODIUM SERPL-SCNC: 140 MMOL/L — SIGNIFICANT CHANGE UP (ref 135–145)
T PALLIDUM AB TITR SER: NEGATIVE — SIGNIFICANT CHANGE UP
VANCOMYCIN TROUGH SERPL-MCNC: 7.6 UG/ML — LOW (ref 10–20)
WBC # BLD: 3.23 K/UL — LOW (ref 3.8–10.5)
WBC # FLD AUTO: 3.23 K/UL — LOW (ref 3.8–10.5)

## 2020-12-12 PROCEDURE — 99232 SBSQ HOSP IP/OBS MODERATE 35: CPT

## 2020-12-12 RX ORDER — BENZOCAINE AND MENTHOL 5; 1 G/100ML; G/100ML
1 LIQUID ORAL
Refills: 0 | Status: DISCONTINUED | OUTPATIENT
Start: 2020-12-12 | End: 2020-12-13

## 2020-12-12 RX ADMIN — Medication 650 MILLIGRAM(S): at 13:32

## 2020-12-12 RX ADMIN — Medication 650 MILLIGRAM(S): at 14:58

## 2020-12-12 RX ADMIN — Medication 650 MILLIGRAM(S): at 01:25

## 2020-12-12 RX ADMIN — Medication 500000 UNIT(S): at 17:25

## 2020-12-12 RX ADMIN — CEFTRIAXONE 100 MILLIGRAM(S): 500 INJECTION, POWDER, FOR SOLUTION INTRAMUSCULAR; INTRAVENOUS at 05:55

## 2020-12-12 RX ADMIN — Medication 650 MILLIGRAM(S): at 00:55

## 2020-12-12 RX ADMIN — ONDANSETRON 4 MILLIGRAM(S): 8 TABLET, FILM COATED ORAL at 06:55

## 2020-12-12 RX ADMIN — SODIUM CHLORIDE 125 MILLILITER(S): 9 INJECTION INTRAMUSCULAR; INTRAVENOUS; SUBCUTANEOUS at 00:56

## 2020-12-12 RX ADMIN — Medication 250 MILLIGRAM(S): at 14:26

## 2020-12-12 RX ADMIN — ONDANSETRON 4 MILLIGRAM(S): 8 TABLET, FILM COATED ORAL at 13:30

## 2020-12-12 RX ADMIN — BENZOCAINE AND MENTHOL 1 LOZENGE: 5; 1 LIQUID ORAL at 00:56

## 2020-12-12 RX ADMIN — Medication 500000 UNIT(S): at 11:26

## 2020-12-12 RX ADMIN — Medication 650 MILLIGRAM(S): at 05:53

## 2020-12-12 RX ADMIN — Medication 250 MILLIGRAM(S): at 05:55

## 2020-12-12 RX ADMIN — Medication 500000 UNIT(S): at 05:55

## 2020-12-12 RX ADMIN — CEFTRIAXONE 100 MILLIGRAM(S): 500 INJECTION, POWDER, FOR SOLUTION INTRAMUSCULAR; INTRAVENOUS at 17:25

## 2020-12-12 NOTE — CHART NOTE - NSCHARTNOTEFT_GEN_A_CORE
Pt with new photophobia earlier today, primary physician aware. Pt seen and examined at bedside with complaints of flashes and flickers in visual field also mild left eye pain all of which are  unchanged symptoms from early today. Day team discussed case w/ Dr. Mendoza via telephone. Per Dr. Mendoza ([facility], [phone]), recommended OP f/u upon D/C. Impression was posterior/anterior uveitis, hospital not equipped to facilitate diagnosis of uveitis at bedside. Dr. Mendoza will also speak to Pt's sister Connie regarding impression. If clinical situation changes, Dr. Mendoza will be notified and will evaluate Pt at bedside if warranted. Pt denies changes in vision, dizziness  fever, chills, headaches, dizziness, chest pain/pressure, palpitations, shortness of breath, difficulty breathing, abdominal pain, n/v/d or any other complaints.    At this time patient symptoms are unchanged, pt in NAD, VSS.      pt denies full physical exam at this time, modified PE performed  PE:   GENERAL: Pt lying in bed comfortably in NAD  HEAD:  Atraumatic   EYES: EOMI, PERRL, conjunctiva and sclera clear Pt with new photophobia earlier today, primary physician aware on day team.   Pt seen and examined at bedside tonight with continued complaints of flashes and flickers in visual field also mild left eye pain all of which are  unchanged symptoms from early today.   Day team discussed case w/ Dr. Mendoza via telephone. Per Dr. Mendoza ([facility], [phone]), recommended outpt f/u upon D/C. Impression was posterior/anterior uveitis. Dr. Mendoza will also speak to Pt's sister Connie regarding impression. If clinical situation changes, Dr. Mendoza will be notified and will evaluate Pt at bedside if warranted.   Pt denies changes in vision, dizziness  fever, chills, headaches, dizziness, chest pain/pressure, palpitations, shortness of breath, difficulty breathing, abdominal pain, n/v/d or any other complaints.    At this time patient symptoms are unchanged, pt in NAD, VSS.      pt sleeping at time of visit and denies full physical exam at this time, modified PE performed  PE:   GENERAL: Pt lying in bed comfortably in NAD  HEAD:  Atraumatic   EYES: EOMI, PERRL, conjunctiva and sclera clear    RN instructed to notify provider if any changes occur in pt status. Will continue to monitor

## 2020-12-12 NOTE — PROGRESS NOTE ADULT - SUBJECTIVE AND OBJECTIVE BOX
seen for meningitis    complaining of photophobia, infrequent light flashes  no neck pain/headaches. feels overall improved.  dry corners of mouth. no odynophagia   ros negative     MEDICATIONS  (STANDING):  cefTRIAXone   IVPB 2000 milliGRAM(s) IV Intermittent every 12 hours  influenza   Vaccine 0.5 milliLiter(s) IntraMuscular once  nystatin    Suspension 051860 Unit(s) Oral four times a day  vancomycin  IVPB 1000 milliGRAM(s) IV Intermittent every 8 hours    MEDICATIONS  (PRN):  acetaminophen   Tablet .. 650 milliGRAM(s) Oral every 6 hours PRN Temp greater or equal to 38C (100.4F), Mild Pain (1 - 3)  benzocaine 15 mG/menthol 3.6 mG (Sugar-Free) Lozenge 1 Lozenge Oral four times a day PRN Cough  morphine  - Injectable 2 milliGRAM(s) IV Push every 4 hours PRN Moderate Pain (4 - 6)  ondansetron Injectable 4 milliGRAM(s) IV Push every 6 hours PRN Nausea and/or Vomiting      Allergies    No Known Allergies      Vital Signs Last 24 Hrs  T(C): 36.6 (12 Dec 2020 08:27), Max: 37.1 (11 Dec 2020 17:20)  T(F): 97.9 (12 Dec 2020 08:27), Max: 98.7 (11 Dec 2020 17:20)  HR: 76 (12 Dec 2020 08:27) (70 - 76)  BP: 109/60 (12 Dec 2020 08:27) (99/56 - 109/60)  BP(mean): --  RR: 18 (12 Dec 2020 08:27) (18 - 18)  SpO2: 98% (12 Dec 2020 08:27) (95% - 98%)    PHYSICAL EXAM:    GENERAL: NAD  HEENT: +EOMI, resolving thrush. (minimal on tongue)  NECK: supple, no rigidity   CHEST/LUNG: Clear to percussion bilaterally  HEART: Regular rate and rhythm; S1 S2  ABDOMEN: Soft, Nondistended; Bowel sounds present  EXTREMITIES: no edema   NERVOUS SYSTEM:  Alert & Oriented X3, Motor Strength 5/5 B/L upper and lower extremities      LABS:                        10.1   3.23  )-----------( 177      ( 12 Dec 2020 09:23 )             30.1     12-12    140  |  110<H>  |  6.0<L>  ----------------------------<  94  3.8   |  21.0<L>  |  0.50    Ca    8.6      12 Dec 2020 09:23        Urinalysis Basic - ( 10 Dec 2020 11:37 )    Color: Yellow / Appearance: Clear / S.010 / pH: x  Gluc: x / Ketone: Negative  / Bili: Negative / Urobili: Negative mg/dL   Blood: x / Protein: Negative mg/dL / Nitrite: Negative   Leuk Esterase: Negative / RBC: 3-5 /HPF / WBC 0-2   Sq Epi: x / Non Sq Epi: Occasional / Bacteria: Occasional        CAPILLARY BLOOD GLUCOSE            RADIOLOGY & ADDITIONAL TESTS:

## 2020-12-12 NOTE — CHART NOTE - NSCHARTNOTEFT_GEN_A_CORE
Pt c/o NV w/new onset photophobia. Pt states she is having increased Lt eye pain w/flashes and flickers in visual field. Pt assessed at bedside by PA. Pt admitted to [facility] for meningitis. Denies SOB, CP, HA, dizziness, fevers, chills, diarrhea, changes in urinary habits. All other remaining ROS negative. PE not performed per Pt request.     ROS: see above    Vital Signs Last 24 Hrs  T(C): 36.6 (12 Dec 2020 08:27), Max: 37.1 (11 Dec 2020 17:20)  T(F): 97.9 (12 Dec 2020 08:27), Max: 98.7 (11 Dec 2020 17:20)  HR: 76 (12 Dec 2020 08:27) (70 - 76)  BP: 109/60 (12 Dec 2020 08:27) (99/56 - 109/60)  BP(mean): --  ABP: --  ABP(mean): --  RR: 18 (12 Dec 2020 08:27) (18 - 18)  SpO2: 98% (12 Dec 2020 08:27) (95% - 98%)    Physical Exam: not performed per Pt request    Assessment/Plan:  Pt is a 29yo Female admitted to [facility] for suspected meningitis w/PMHx of Chalmydia, oral thrush, PCOS. ID following, rec's appreciated. POC discussed w/Dr. Panchal & Dr. Mendoza (opthomology). As well, per Pt request, spoke to Pt's sister Connie at great lengths regarding POC and Pt's clinical course. It is my impression Pt's sister is dictating care and is requesting/demanding specific tests/medications be performed/ordered and diagnosis be considered.     1. Photophobia  - Discussed case w/ Dr. Mendoza via telephone. Per Dr. Mendoza ([facility], [phone]), advised will see Pt as OP upon D/C. Impression was posterior/anterior uveitis, hospital not equipped to facilitate diagnosis of uveitis at bedside. Dr. Mendoza will also speak to Pt's sister Connie regarding impression/POC. If clinical situation changes, Dr. Mendoza advised will evaluate Pt at bedside if warranted.  - Zofran for NV  - Advised RN to call Provider w/further clinical changes  - Will continue to monitor

## 2020-12-12 NOTE — PROGRESS NOTE ADULT - ASSESSMENT
30F with PCOS p/w fevers, headache, and nausea at home found to have leukocytosis and lumbar puncture with elevated opening pressure and neutrophil count. Treating for suspected aseptic meningitis    meningitis: likely aseptic. ID following. f/u cultures. dc abx if negative bacterial cultures    HSV/HIV negative     Thrush - improving, Oral nystatin.    Insulin resistance - The patient was on metformin at home. To monitor glucose levels. On a consistent carbohydrate diet.    Pituitary microadenoma - The patient was on cabergoline twice a week. Held for now.    Discussed with the patient in detail.       dispo: once cleared by ID

## 2020-12-13 ENCOUNTER — TRANSCRIPTION ENCOUNTER (OUTPATIENT)
Age: 30
End: 2020-12-13

## 2020-12-13 VITALS
DIASTOLIC BLOOD PRESSURE: 76 MMHG | TEMPERATURE: 98 F | SYSTOLIC BLOOD PRESSURE: 118 MMHG | RESPIRATION RATE: 18 BRPM | OXYGEN SATURATION: 99 % | HEART RATE: 65 BPM

## 2020-12-13 LAB
ALBUMIN SERPL ELPH-MCNC: 3.7 G/DL — SIGNIFICANT CHANGE UP (ref 3.3–5.2)
ALP SERPL-CCNC: 34 U/L — LOW (ref 40–120)
ALT FLD-CCNC: 11 U/L — SIGNIFICANT CHANGE UP
ANION GAP SERPL CALC-SCNC: 7 MMOL/L — SIGNIFICANT CHANGE UP (ref 5–17)
AST SERPL-CCNC: 21 U/L — SIGNIFICANT CHANGE UP
BASOPHILS # BLD AUTO: 0.01 K/UL — SIGNIFICANT CHANGE UP (ref 0–0.2)
BASOPHILS NFR BLD AUTO: 0.2 % — SIGNIFICANT CHANGE UP (ref 0–2)
BILIRUB SERPL-MCNC: <0.2 MG/DL — LOW (ref 0.4–2)
BUN SERPL-MCNC: 4 MG/DL — LOW (ref 8–20)
CALCIUM SERPL-MCNC: 9.2 MG/DL — SIGNIFICANT CHANGE UP (ref 8.6–10.2)
CHLORIDE SERPL-SCNC: 106 MMOL/L — SIGNIFICANT CHANGE UP (ref 98–107)
CO2 SERPL-SCNC: 24 MMOL/L — SIGNIFICANT CHANGE UP (ref 22–29)
CREAT SERPL-MCNC: 0.51 MG/DL — SIGNIFICANT CHANGE UP (ref 0.5–1.3)
CULTURE RESULTS: SIGNIFICANT CHANGE UP
EOSINOPHIL # BLD AUTO: 0.04 K/UL — SIGNIFICANT CHANGE UP (ref 0–0.5)
EOSINOPHIL NFR BLD AUTO: 0.9 % — SIGNIFICANT CHANGE UP (ref 0–6)
GLUCOSE SERPL-MCNC: 88 MG/DL — SIGNIFICANT CHANGE UP (ref 70–99)
HCT VFR BLD CALC: 32 % — LOW (ref 34.5–45)
HGB BLD-MCNC: 10.7 G/DL — LOW (ref 11.5–15.5)
IMM GRANULOCYTES NFR BLD AUTO: 0.2 % — SIGNIFICANT CHANGE UP (ref 0–1.5)
LYMPHOCYTES # BLD AUTO: 1.74 K/UL — SIGNIFICANT CHANGE UP (ref 1–3.3)
LYMPHOCYTES # BLD AUTO: 37.9 % — SIGNIFICANT CHANGE UP (ref 13–44)
MCHC RBC-ENTMCNC: 29.4 PG — SIGNIFICANT CHANGE UP (ref 27–34)
MCHC RBC-ENTMCNC: 33.4 GM/DL — SIGNIFICANT CHANGE UP (ref 32–36)
MCV RBC AUTO: 87.9 FL — SIGNIFICANT CHANGE UP (ref 80–100)
MONOCYTES # BLD AUTO: 0.38 K/UL — SIGNIFICANT CHANGE UP (ref 0–0.9)
MONOCYTES NFR BLD AUTO: 8.3 % — SIGNIFICANT CHANGE UP (ref 2–14)
NEUTROPHILS # BLD AUTO: 2.41 K/UL — SIGNIFICANT CHANGE UP (ref 1.8–7.4)
NEUTROPHILS NFR BLD AUTO: 52.5 % — SIGNIFICANT CHANGE UP (ref 43–77)
PLATELET # BLD AUTO: 219 K/UL — SIGNIFICANT CHANGE UP (ref 150–400)
POTASSIUM SERPL-MCNC: 3.3 MMOL/L — LOW (ref 3.5–5.3)
POTASSIUM SERPL-SCNC: 3.3 MMOL/L — LOW (ref 3.5–5.3)
PROT SERPL-MCNC: 6.3 G/DL — LOW (ref 6.6–8.7)
RBC # BLD: 3.64 M/UL — LOW (ref 3.8–5.2)
RBC # FLD: 12.7 % — SIGNIFICANT CHANGE UP (ref 10.3–14.5)
SARS-COV-2 IGG SERPL QL IA: NEGATIVE — SIGNIFICANT CHANGE UP
SARS-COV-2 IGM SERPL IA-ACNC: <0.1 INDEX — SIGNIFICANT CHANGE UP
SODIUM SERPL-SCNC: 137 MMOL/L — SIGNIFICANT CHANGE UP (ref 135–145)
SPECIMEN SOURCE: SIGNIFICANT CHANGE UP
VANCOMYCIN TROUGH SERPL-MCNC: <4 UG/ML — LOW (ref 10–20)
WBC # BLD: 4.59 K/UL — SIGNIFICANT CHANGE UP (ref 3.8–10.5)
WBC # FLD AUTO: 4.59 K/UL — SIGNIFICANT CHANGE UP (ref 3.8–10.5)

## 2020-12-13 PROCEDURE — 89051 BODY FLUID CELL COUNT: CPT

## 2020-12-13 PROCEDURE — 99285 EMERGENCY DEPT VISIT HI MDM: CPT | Mod: 25

## 2020-12-13 PROCEDURE — 84157 ASSAY OF PROTEIN OTHER: CPT

## 2020-12-13 PROCEDURE — 82962 GLUCOSE BLOOD TEST: CPT

## 2020-12-13 PROCEDURE — 87529 HSV DNA AMP PROBE: CPT

## 2020-12-13 PROCEDURE — 84145 PROCALCITONIN (PCT): CPT

## 2020-12-13 PROCEDURE — 96375 TX/PRO/DX INJ NEW DRUG ADDON: CPT

## 2020-12-13 PROCEDURE — 99239 HOSP IP/OBS DSCHRG MGMT >30: CPT

## 2020-12-13 PROCEDURE — 85025 COMPLETE CBC W/AUTO DIFF WBC: CPT

## 2020-12-13 PROCEDURE — 71046 X-RAY EXAM CHEST 2 VIEWS: CPT

## 2020-12-13 PROCEDURE — 96374 THER/PROPH/DIAG INJ IV PUSH: CPT

## 2020-12-13 PROCEDURE — 80202 ASSAY OF VANCOMYCIN: CPT

## 2020-12-13 PROCEDURE — 93005 ELECTROCARDIOGRAM TRACING: CPT

## 2020-12-13 PROCEDURE — 70551 MRI BRAIN STEM W/O DYE: CPT

## 2020-12-13 PROCEDURE — 85027 COMPLETE CBC AUTOMATED: CPT

## 2020-12-13 PROCEDURE — 36415 COLL VENOUS BLD VENIPUNCTURE: CPT

## 2020-12-13 PROCEDURE — 86592 SYPHILIS TEST NON-TREP QUAL: CPT

## 2020-12-13 PROCEDURE — 83605 ASSAY OF LACTIC ACID: CPT

## 2020-12-13 PROCEDURE — 86664 EPSTEIN-BARR NUCLEAR ANTIGEN: CPT

## 2020-12-13 PROCEDURE — 99232 SBSQ HOSP IP/OBS MODERATE 35: CPT

## 2020-12-13 PROCEDURE — 80053 COMPREHEN METABOLIC PANEL: CPT

## 2020-12-13 PROCEDURE — 87070 CULTURE OTHR SPECIMN AEROBIC: CPT

## 2020-12-13 PROCEDURE — 86663 EPSTEIN-BARR ANTIBODY: CPT

## 2020-12-13 PROCEDURE — 87491 CHLMYD TRACH DNA AMP PROBE: CPT

## 2020-12-13 PROCEDURE — 80048 BASIC METABOLIC PNL TOTAL CA: CPT

## 2020-12-13 PROCEDURE — 87476 LYME DIS DNA AMP PROBE: CPT

## 2020-12-13 PROCEDURE — 81001 URINALYSIS AUTO W/SCOPE: CPT

## 2020-12-13 PROCEDURE — 83615 LACTATE (LD) (LDH) ENZYME: CPT

## 2020-12-13 PROCEDURE — 87205 SMEAR GRAM STAIN: CPT

## 2020-12-13 PROCEDURE — 70551 MRI BRAIN STEM W/O DYE: CPT | Mod: 26

## 2020-12-13 PROCEDURE — 70450 CT HEAD/BRAIN W/O DYE: CPT

## 2020-12-13 PROCEDURE — 86780 TREPONEMA PALLIDUM: CPT

## 2020-12-13 PROCEDURE — U0003: CPT

## 2020-12-13 PROCEDURE — 85379 FIBRIN DEGRADATION QUANT: CPT

## 2020-12-13 PROCEDURE — 86665 EPSTEIN-BARR CAPSID VCA: CPT

## 2020-12-13 PROCEDURE — 86769 SARS-COV-2 COVID-19 ANTIBODY: CPT

## 2020-12-13 PROCEDURE — 86703 HIV-1/HIV-2 1 RESULT ANTBDY: CPT

## 2020-12-13 PROCEDURE — 84702 CHORIONIC GONADOTROPIN TEST: CPT

## 2020-12-13 PROCEDURE — 82945 GLUCOSE OTHER FLUID: CPT

## 2020-12-13 PROCEDURE — 87536 HIV-1 QUANT&REVRSE TRNSCRPJ: CPT

## 2020-12-13 PROCEDURE — 84484 ASSAY OF TROPONIN QUANT: CPT

## 2020-12-13 PROCEDURE — 87591 N.GONORRHOEAE DNA AMP PROB: CPT

## 2020-12-13 PROCEDURE — 87040 BLOOD CULTURE FOR BACTERIA: CPT

## 2020-12-13 PROCEDURE — 87631 RESP VIRUS 3-5 TARGETS: CPT

## 2020-12-13 RX ORDER — ACETAMINOPHEN 500 MG
2 TABLET ORAL
Qty: 0 | Refills: 0 | DISCHARGE
Start: 2020-12-13

## 2020-12-13 RX ORDER — NYSTATIN 500MM UNIT
5 POWDER (EA) MISCELLANEOUS
Qty: 100 | Refills: 0
Start: 2020-12-13 | End: 2020-12-17

## 2020-12-13 RX ADMIN — Medication 650 MILLIGRAM(S): at 04:53

## 2020-12-13 RX ADMIN — Medication 500000 UNIT(S): at 01:30

## 2020-12-13 RX ADMIN — Medication 500000 UNIT(S): at 17:42

## 2020-12-13 RX ADMIN — Medication 500000 UNIT(S): at 12:44

## 2020-12-13 RX ADMIN — Medication 500000 UNIT(S): at 04:53

## 2020-12-13 NOTE — PROGRESS NOTE ADULT - SUBJECTIVE AND OBJECTIVE BOX
[facility]  INFECTIOUS DISEASES AND INTERNAL MEDICINE at [address]  =======================================================  Davon Presley MD  Diplomates American Board of Internal Medicine and Infectious Diseases  Tel: [phone]      Fax: [phone]  =======================================================    GEOFF FOWLER [id number]    Follow up: aseptic meningitis  patient is afebrile and clinically improved  she still has persistent flashes of light but no photophobia or neck stiffness  headache improved-now having low back pain at LP site and notices headache when she is leaning forward-likely post LP headache  no more nausea and vomiting      Allergies:  No Known Allergies           REVIEW OF SYSTEMS:  CONSTITUTIONAL:  No Fever or chills  HEENT:   No diplopia or blurred vision.  No earache, sore throat or runny nose.  CARDIOVASCULAR:  No pressure, squeezing, strangling, tightness, heaviness or aching about the chest, neck, axilla or epigastrium.  RESPIRATORY:  No cough, shortness of breath  GASTROINTESTINAL:  No nausea, vomiting or diarrhea.  GENITOURINARY:  No dysuria, frequency or urgency. No Blood in urine  MUSCULOSKELETAL:  no joint aches, no muscle pain  SKIN:  No change in skin, hair or nails.  NEUROLOGIC:  No Headaches, seizures or weakness.  PSYCHIATRIC:  No disorder of thought or mood.  ENDOCRINE:  No heat or cold intolerance  HEMATOLOGICAL:  No easy bruising or bleeding.       Physical Exam:  GEN: NAD, pleasant, sitting up in bed talking on phone  HEENT: normocephalic and atraumatic. EOMI. PERRL.  Anicteric   NECK: Supple.   LUNGS: Clear to auscultation.  HEART: Regular rate and rhythm without murmur.  ABDOMEN: Soft, nontender, and nondistended.  Positive bowel sounds.    : No CVA tenderness  EXTREMITIES: Without any edema.  MSK: no joint swelling  NEUROLOGIC: Cranial nerves II through XII are grossly intact. No focal deficits, no meningeal signs  PSYCHIATRIC: Appropriate affect .  SKIN: No Rash      Vitals:    T(F): 98.1 (13 Dec 2020 18:55), Max: 98.8 (13 Dec 2020 15:53)  HR: 65 (13 Dec 2020 18:55)  BP: 118/76 (13 Dec 2020 18:55)  RR: 18 (13 Dec 2020 18:55)  SpO2: 99% (13 Dec 2020 18:55) (98% - 99%)  temp max in last 48H T(F): , Max: 98.8 (12-13-20 @ 15:53)      Current Antibiotics:  nystatin    Suspension 224125 Unit(s) Oral four times a day    Other medications:  influenza   Vaccine 0.5 milliLiter(s) IntraMuscular once        Labs:                        10.7   4.59  )-----------( 219      ( 13 Dec 2020 08:50 )             32.0      12-13    137  |  106  |  4.0<L>  ----------------------------<  88  3.3<L>   |  24.0  |  0.51    Ca    9.2      13 Dec 2020 08:36    TPro  6.3<L>  /  Alb  3.7  /  TBili  <0.2<L>  /  DBili  x   /  AST  21  /  ALT  11  /  AlkPhos  34<L>  12-13      Culture - CSF with Gram Stain (collected 12-10-20 @ 21:56)  Source: .CSF CSF  Gram Stain (12-10-20 @ 22:22):    No polymorphonuclear cells seen per low power field    No organisms seen per oil power field    by cytocentrifuge  Final Report (12-13-20 @ 16:41):    No growth at 3 days.    Culture - Blood (collected 12-10-20 @ 11:51)  Source: .Blood Blood    Culture - Blood (collected 12-10-20 @ 11:51)  Source: .Blood Blood      WBC Count: 4.59 K/uL (12-13-20 @ 08:50)  WBC Count: 3.23 K/uL (12-12-20 @ 09:23)  WBC Count: 13.50 K/uL (12-11-20 @ 07:45)  WBC Count: 26.92 K/uL (12-10-20 @ 08:26)    Creatinine, Serum: 0.51 mg/dL (12-13-20 @ 08:36)  Creatinine, Serum: 0.50 mg/dL (12-12-20 @ 09:23)  Creatinine, Serum: 0.63 mg/dL (12-11-20 @ 07:45)  Creatinine, Serum: 0.81 mg/dL (12-10-20 @ 08:26)          Procalcitonin, Serum: 2.82 ng/mL (12-11-20 @ 18:41)       COVID-19 IgG Antibody Index: <0.10 Index (12-11-20 @ 12:21)  COVID-19 IgG Antibody Interpretation: Negative (12-11-20 @ 12:21)  COVID-19 PCR: NotDetec (12-10-20 @ 09:34)    < from: MR Head No Cont (12.13.20 @ 17:04) >  FINDINGS: The brain parenchyma is normal in signal and morphology. There is no evidence of acute ischemia on the diffusion-weighted images.    Ventricular size and configuration is unremarkable. No abnormal extra axial fluid collections are noted. Flow-voids are noted throughout the major intracranial vessels, on the T2 weighted images, consistent with their patency. The sella turcica and posterior fossa are unremarkable.    The paranasal sinuses and mastoid air cells are clear. Calvarial signal is within normal limits. The orbits appear unremarkable.    IMPRESSION: Unremarkable noncontrast brain MRI examination.        < end of copied text >

## 2020-12-13 NOTE — DISCHARGE NOTE PROVIDER - HOSPITAL COURSE
30F with PCOS p/w fevers, headache, and nausea at home found to have leukocytosis and lumbar puncture with elevated opening pressure and neutrophil count. Treating for suspected aseptic meningitis.  IV acyclovir and antibiotics discontinued. negative workup.  Treated for oral thrush, negative HIV testing.   intermittent light flashes in visual fields, photophobia. advised outpatient optho evaluation.    dc planning 32 minutes

## 2020-12-13 NOTE — PROGRESS NOTE ADULT - ASSESSMENT
30F with PCOS p/w fevers, headache, and nausea at home found to have leukocytosis and lumbar puncture with elevated opening pressure and neutrophil count. Treating for suspected aseptic meningitis    meningitis: likely aseptic due to flagyl. ID following. f/u cultures. off antibiotics/antivirals   HSV/HIV negative     advised to follow up with ID and PMD    sister requesting MRI (ordered) and optho eval inpatient. explained opthalmologist recommending outpatient follow up due to equipment limitations in hospital.    Thrush - resolving , Oral nystatin.    Insulin resistance - The patient was on metformin at home. To monitor glucose levels. On a consistent carbohydrate diet.    Pituitary microadenoma - The patient was on cabergoline twice a week. Held for now.    Discussed with the patient in detail.       medically stable for discharge.  dc post MRI    updated patient/sister and PMD Dr Lombardi

## 2020-12-13 NOTE — DISCHARGE NOTE PROVIDER - NSDCCPCAREPLAN_GEN_ALL_CORE_FT
PRINCIPAL DISCHARGE DIAGNOSIS  Diagnosis: Aseptic meningitis  Assessment and Plan of Treatment: follow up with infectious disease and opthalmology       PRINCIPAL DISCHARGE DIAGNOSIS  Diagnosis: Aseptic meningitis  Assessment and Plan of Treatment: likely from metronidazole.  follow up with infectious disease and opthalmology      SECONDARY DISCHARGE DIAGNOSES  Diagnosis: Oral thrush  Assessment and Plan of Treatment: take nystatin as prescribed

## 2020-12-13 NOTE — PROGRESS NOTE ADULT - ASSESSMENT
30y  Female presented with headache and dizziness. She also had some sensation of photophobia. She reports that she had recently developing oral thrush and was prescribed treatment by her primary care physician but had not taken the medication as of yet. She also reported previously being treated with azithromycin for chlamydia two months ago. She also developed bacterial vaginosis and a yeast infection afterwards and was treated with metronidazole and fluconazole. She developed a fever at home of 101.8 but denied any chills associated with headache. On presentation to the emergency department, the patient had continued headache despite analgesic medications and a lumbar puncture was obtained with an elevated opening pressure and elevated neutrophil count.       Likely Aseptic drug induced meningitis   Fever  oral thrush      - Blood cultures ngtd  - COVID 19 PCR negative  - Influenza negative   - CT Head no acute findings  - MR (-)  - LP results with high nucleated cells with neutrophilic predominance, HSV CSF PCR negative, CSF culture gram stain negative , culture NGTD, HSV/VZV PCR (-)  - UA negative for UTI  - Procalcitonin level 2.8  - HIv VL undetectable  -  urine GC/Chlamydia  not sent  - Syphilis screen  (-)  - CSF borrelia PCR and VDRl pending  - antibiotics were stopped last evening and the patient remains afebrile and feels well. Apart from the flashes of light in her eyes she has improved. Suspect drug induced meningitis. About 3 weeks ago she saw a tick crawling on her leg but did notice any bites or rashes. Too early for CNS lyme even if she had a tick bite then  - As there is no clear evidence of bacterial meningitis and she has remained stable off of antibiotics  x 24h, reasonable to hold further antibiotics. The patient was told she needs close outpatient f/u and should her symptoms return or worsen she should present to the ED for repeat spinal fluid analysis  - patient understands the plan and agrees      outpatient f/u neurology, opthalmology and ID    d/w Dr Carbajal

## 2020-12-13 NOTE — CHART NOTE - NSCHARTNOTEFT_GEN_A_CORE
Pt c/o of continuing photophobia unchanged from day prior. Re-consulted Optho, per Dr. Mendoza his impression remains unchanged from day prior (see chart note), will see as OP upon D/C. Possible D/C in a.m. Will call [facility] [phone] at 9a.m. to make OP appointment for Pt. MRI also pending. Discussed POC w/Dr. Panchal. Will continue to monitor.

## 2020-12-13 NOTE — PROGRESS NOTE ADULT - SUBJECTIVE AND OBJECTIVE BOX
seen for meningitis    no photophobia, intermittent transient flashing of eyes.  neck pain overnight  ros otherwise negative     MEDICATIONS  (STANDING):  influenza   Vaccine 0.5 milliLiter(s) IntraMuscular once  nystatin    Suspension 515197 Unit(s) Oral four times a day    MEDICATIONS  (PRN):  acetaminophen   Tablet .. 650 milliGRAM(s) Oral every 6 hours PRN Temp greater or equal to 38C (100.4F), Mild Pain (1 - 3)  benzocaine 15 mG/menthol 3.6 mG (Sugar-Free) Lozenge 1 Lozenge Oral four times a day PRN Cough  morphine  - Injectable 2 milliGRAM(s) IV Push every 4 hours PRN Moderate Pain (4 - 6)  ondansetron Injectable 4 milliGRAM(s) IV Push every 6 hours PRN Nausea and/or Vomiting      Allergies    No Known Allergies      Vital Signs Last 24 Hrs  T(C): 36.9 (13 Dec 2020 08:05), Max: 36.9 (13 Dec 2020 08:05)  T(F): 98.5 (13 Dec 2020 08:05), Max: 98.5 (13 Dec 2020 08:05)  HR: 55 (13 Dec 2020 08:05) (55 - 72)  BP: 121/74 (13 Dec 2020 08:05) (107/67 - 121/74)  BP(mean): --  RR: 18 (13 Dec 2020 08:05) (18 - 18)  SpO2: 98% (13 Dec 2020 08:05) (98% - 98%)    PHYSICAL EXAM:    GENERAL: NAD  HEENT: PERRL, +EOMI  NECK: Supple no rigidity noted. no point tenderness of cervical spine  CHEST/LUNG: Clear to percussion bilaterally;  HEART: Regular rate and rhythm; S1 S2  ABDOMEN: Soft, Nontender,  Bowel sounds present  EXTREMITIES:  no edema   NERVOUS SYSTEM:  Alert & Oriented X3, Motor Strength 5/5 B/L upper and lower extremities  PSYCH: normal mood, appropriate response.    LABS:                        10.7   4.59  )-----------( 219      ( 13 Dec 2020 08:50 )             32.0     12-13    137  |  106  |  4.0<L>  ----------------------------<  88  3.3<L>   |  24.0  |  0.51    Ca    9.2      13 Dec 2020 08:36    TPro  6.3<L>  /  Alb  3.7  /  TBili  <0.2<L>  /  DBili  x   /  AST  21  /  ALT  11  /  AlkPhos  34<L>  12-13          CAPILLARY BLOOD GLUCOSE            RADIOLOGY & ADDITIONAL TESTS:

## 2020-12-13 NOTE — DISCHARGE NOTE PROVIDER - CARE PROVIDER_API CALL
Marco Mendoza  OPHTHALMOLOGY  [address] [place] [address]  Phone: [phone]  Fax: [phone]  Follow Up Time:     Dar Lopez)  Infectious Disease; Internal Medicine  [address] [place] [address], [place] [address]  Phone: [phone]  Fax: [phone]  Follow Up Time:    Marco Mendoza  OPHTHALMOLOGY  [address] [place] [address]  Phone: [phone]  Fax: [phone]  Follow Up Time:     Dar Lopez)  Infectious Disease; Internal Medicine  [address] [facility], [address]  [place] [address]  Phone: [phone]  Fax: [phone]  Follow Up Time:     Lombardi, Adrian C  FAMILY MEDICINE  [address]  [place] [address]  Phone: [phone]  Fax: [phone]  Follow Up Time:

## 2020-12-13 NOTE — DISCHARGE NOTE NURSING/CASE MANAGEMENT/SOCIAL WORK - PATIENT PORTAL LINK FT
You can access the FollowMyHealth Patient Portal offered by [facility] by registering at the following website: [email or web address]. By joining [employer]’s FollowMyHealth portal, you will also be able to view your health information using other applications (apps) compatible with our system.

## 2020-12-13 NOTE — DISCHARGE NOTE PROVIDER - NSDCMRMEDTOKEN_GEN_ALL_CORE_FT
cabergoline 0.5 mg oral tablet: 0.5 tab(s) orally 2 times a week  metFORMIN 500 mg oral tablet: 3 tab(s) orally once a day   acetaminophen 325 mg oral tablet: 2 tab(s) orally every 6 hours, As needed, Temp greater or equal to 38C (100.4F), Mild Pain (1 - 3)  cabergoline 0.5 mg oral tablet: 0.5 tab(s) orally 2 times a week  metFORMIN 500 mg oral tablet: 3 tab(s) orally once a day  nystatin 100,000 units/mL oral suspension: 5 milliliter(s) orally 4 times a day

## 2020-12-13 NOTE — DISCHARGE NOTE PROVIDER - NSDCADMDATE_GEN_ALL_CORE_FT
10-Dec-2020 14:52
Mirian Horton (NP; RN)  NP in Pediatrics  [address] [address] [place] [address]  Phone: [phone]  Fax: [phone]  Follow Up Time: 2 weeks

## 2020-12-13 NOTE — DISCHARGE NOTE PROVIDER - CARE PROVIDERS DIRECT ADDRESSES
,DirectAddress_Unknown,[email or web address] ,DirectAddress_Unknown,[email or web address],DirectAddress_Unknown

## 2020-12-13 NOTE — DISCHARGE NOTE PROVIDER - PROVIDER TOKENS
PROVIDER:[TOKEN:[[id number]]],PROVIDER:[TOKEN:[[id number]]] PROVIDER:[TOKEN:[[id number]]],PROVIDER:[TOKEN:[[id number]]],PROVIDER:[TOKEN:[[id number]]]

## 2020-12-14 LAB
C TRACH RRNA SPEC QL NAA+PROBE: SIGNIFICANT CHANGE UP
N GONORRHOEA RRNA SPEC QL NAA+PROBE: SIGNIFICANT CHANGE UP
SPECIMEN SOURCE: SIGNIFICANT CHANGE UP
VDRL CSF-TITR: SIGNIFICANT CHANGE UP

## 2020-12-15 LAB
CULTURE RESULTS: SIGNIFICANT CHANGE UP
CULTURE RESULTS: SIGNIFICANT CHANGE UP
SPECIMEN SOURCE: SIGNIFICANT CHANGE UP
SPECIMEN SOURCE: SIGNIFICANT CHANGE UP

## 2020-12-19 LAB — B BURGDOR DNA SPEC QL NAA+PROBE: NEGATIVE — SIGNIFICANT CHANGE UP

## 2020-12-22 ENCOUNTER — APPOINTMENT (OUTPATIENT)
Dept: INTERNAL MEDICINE | Facility: CLINIC | Age: 30
End: 2020-12-22
Payer: COMMERCIAL

## 2020-12-22 VITALS
SYSTOLIC BLOOD PRESSURE: 120 MMHG | RESPIRATION RATE: 16 BRPM | HEART RATE: 86 BPM | DIASTOLIC BLOOD PRESSURE: 103 MMHG | BODY MASS INDEX: 24.48 KG/M2 | WEIGHT: 156 LBS | HEIGHT: 67 IN | TEMPERATURE: 98 F

## 2020-12-22 VITALS — SYSTOLIC BLOOD PRESSURE: 118 MMHG | DIASTOLIC BLOOD PRESSURE: 85 MMHG

## 2020-12-22 DIAGNOSIS — R73.9 HYPERGLYCEMIA, UNSPECIFIED: ICD-10-CM

## 2020-12-22 PROCEDURE — 99214 OFFICE O/P EST MOD 30 MIN: CPT | Mod: 25

## 2020-12-22 PROCEDURE — 99072 ADDL SUPL MATRL&STAF TM PHE: CPT

## 2020-12-22 PROCEDURE — 36415 COLL VENOUS BLD VENIPUNCTURE: CPT

## 2020-12-22 RX ORDER — BLOOD SUGAR DIAGNOSTIC
STRIP MISCELLANEOUS
Qty: 25 | Refills: 0 | Status: ACTIVE | COMMUNITY
Start: 2020-07-10

## 2020-12-22 RX ORDER — METFORMIN ER 500 MG 500 MG/1
500 TABLET ORAL
Qty: 90 | Refills: 0 | Status: ACTIVE | COMMUNITY
Start: 2020-09-23

## 2020-12-22 RX ORDER — BLOOD-GLUCOSE METER
W/DEVICE EACH MISCELLANEOUS
Qty: 1 | Refills: 0 | Status: ACTIVE | COMMUNITY
Start: 2020-07-10

## 2020-12-22 RX ORDER — LANCETS 30 GAUGE
EACH MISCELLANEOUS
Qty: 100 | Refills: 0 | Status: ACTIVE | COMMUNITY
Start: 2020-07-10

## 2020-12-28 LAB
ALBUMIN SERPL ELPH-MCNC: 5 G/DL
ALP BLD-CCNC: 43 U/L
ALT SERPL-CCNC: 18 U/L
ANION GAP SERPL CALC-SCNC: 13 MMOL/L
AST SERPL-CCNC: 20 U/L
BASOPHILS # BLD AUTO: 0.06 K/UL
BASOPHILS NFR BLD AUTO: 0.8 %
BILIRUB SERPL-MCNC: 0.5 MG/DL
BUN SERPL-MCNC: 11 MG/DL
CALCIUM SERPL-MCNC: 10.1 MG/DL
CHLORIDE SERPL-SCNC: 103 MMOL/L
CO2 SERPL-SCNC: 22 MMOL/L
CREAT SERPL-MCNC: 0.64 MG/DL
EOSINOPHIL # BLD AUTO: 0.04 K/UL
EOSINOPHIL NFR BLD AUTO: 0.5 %
GLUCOSE SERPL-MCNC: 90 MG/DL
HCT VFR BLD CALC: 37.1 %
HGB BLD-MCNC: 12 G/DL
IMM GRANULOCYTES NFR BLD AUTO: 0.5 %
LYMPHOCYTES # BLD AUTO: 2.21 K/UL
LYMPHOCYTES NFR BLD AUTO: 30.2 %
MAN DIFF?: NORMAL
MCHC RBC-ENTMCNC: 28.8 PG
MCHC RBC-ENTMCNC: 32.3 GM/DL
MCV RBC AUTO: 89 FL
MONOCYTES # BLD AUTO: 0.53 K/UL
MONOCYTES NFR BLD AUTO: 7.2 %
NEUTROPHILS # BLD AUTO: 4.45 K/UL
NEUTROPHILS NFR BLD AUTO: 60.8 %
PLATELET # BLD AUTO: 363 K/UL
POTASSIUM SERPL-SCNC: 4.1 MMOL/L
PROCALCITONIN SERPL-MCNC: 0.04 NG/ML
PROT SERPL-MCNC: 7.7 G/DL
RBC # BLD: 4.17 M/UL
RBC # FLD: 12.9 %
SODIUM SERPL-SCNC: 138 MMOL/L
WBC # FLD AUTO: 7.33 K/UL

## 2021-01-07 ENCOUNTER — TRANSCRIPTION ENCOUNTER (OUTPATIENT)
Age: 31
End: 2021-01-07

## 2021-01-28 ENCOUNTER — LABORATORY RESULT (OUTPATIENT)
Age: 31
End: 2021-01-28

## 2021-01-28 ENCOUNTER — APPOINTMENT (OUTPATIENT)
Dept: PEDIATRIC ALLERGY IMMUNOLOGY | Facility: CLINIC | Age: 31
End: 2021-01-28
Payer: COMMERCIAL

## 2021-01-28 VITALS
BODY MASS INDEX: 23.85 KG/M2 | OXYGEN SATURATION: 100 % | HEART RATE: 78 BPM | HEIGHT: 67 IN | WEIGHT: 151.99 LBS | TEMPERATURE: 98.1 F | DIASTOLIC BLOOD PRESSURE: 81 MMHG | SYSTOLIC BLOOD PRESSURE: 122 MMHG

## 2021-01-28 DIAGNOSIS — D35.2 BENIGN NEOPLASM OF PITUITARY GLAND: ICD-10-CM

## 2021-01-28 PROCEDURE — 99072 ADDL SUPL MATRL&STAF TM PHE: CPT

## 2021-01-28 PROCEDURE — 99204 OFFICE O/P NEW MOD 45 MIN: CPT | Mod: 25

## 2021-01-28 RX ORDER — NYSTATIN 100000 [USP'U]/ML
100000 SUSPENSION ORAL
Qty: 100 | Refills: 0 | Status: COMPLETED | COMMUNITY
Start: 2020-12-13 | End: 2021-01-28

## 2021-01-28 RX ORDER — ALPRAZOLAM 0.25 MG/1
0.25 TABLET ORAL
Qty: 14 | Refills: 0 | Status: COMPLETED | COMMUNITY
Start: 2020-07-10 | End: 2021-01-28

## 2021-01-29 LAB
ALBUMIN SERPL ELPH-MCNC: 4.6 G/DL
ALP BLD-CCNC: 47 U/L
ALT SERPL-CCNC: 14 U/L
ANION GAP SERPL CALC-SCNC: 13 MMOL/L
AST SERPL-CCNC: 22 U/L
BASOPHILS # BLD AUTO: 0.06 K/UL
BASOPHILS NFR BLD AUTO: 0.7 %
BILIRUB SERPL-MCNC: 0.3 MG/DL
BUN SERPL-MCNC: 10 MG/DL
CALCIUM SERPL-MCNC: 9.9 MG/DL
CD16+CD56+ CELLS # BLD: 225 /UL
CD16+CD56+ CELLS NFR BLD: 10 %
CD19 CELLS NFR BLD: 176 /UL
CD3 CELLS # BLD: 1795 /UL
CD3 CELLS NFR BLD: 81 %
CD3+CD4+ CELLS # BLD: 960 /UL
CD3+CD4+ CELLS NFR BLD: 43 %
CD3+CD4+ CELLS/CD3+CD8+ CLL SPEC: 1.52 RATIO
CD3+CD8+ CELLS # SPEC: 633 /UL
CD3+CD8+ CELLS NFR BLD: 29 %
CELLS.CD3-CD19+/CELLS IN BLOOD: 8 %
CHLORIDE SERPL-SCNC: 104 MMOL/L
CO2 SERPL-SCNC: 23 MMOL/L
CREAT SERPL-MCNC: 0.64 MG/DL
DEPRECATED KAPPA LC FREE/LAMBDA SER: 1.82 RATIO
EOSINOPHIL # BLD AUTO: 1.8 K/UL
EOSINOPHIL NFR BLD AUTO: 22.2 %
GLUCOSE SERPL-MCNC: 91 MG/DL
HCT VFR BLD CALC: 35 %
HGB BLD-MCNC: 11.1 G/DL
IGA SER QL IEP: 261 MG/DL
IGG SER QL IEP: 849 MG/DL
IGM SER QL IEP: 96 MG/DL
IMM GRANULOCYTES NFR BLD AUTO: 0.2 %
KAPPA LC CSF-MCNC: 1.1 MG/DL
KAPPA LC SERPL-MCNC: 2 MG/DL
LYMPHOCYTES # BLD AUTO: 2.08 K/UL
LYMPHOCYTES NFR BLD AUTO: 25.6 %
MAN DIFF?: NORMAL
MCHC RBC-ENTMCNC: 28.8 PG
MCHC RBC-ENTMCNC: 31.7 GM/DL
MCV RBC AUTO: 90.9 FL
MEV IGG FLD QL IA: >300 AU/ML
MEV IGG+IGM SER-IMP: POSITIVE
MONOCYTES # BLD AUTO: 0.45 K/UL
MONOCYTES NFR BLD AUTO: 5.5 %
MUV AB SER-ACNC: POSITIVE
MUV IGG SER QL IA: 99.8 AU/ML
NEUTROPHILS # BLD AUTO: 3.7 K/UL
NEUTROPHILS NFR BLD AUTO: 45.8 %
PLATELET # BLD AUTO: 265 K/UL
POTASSIUM SERPL-SCNC: 4.1 MMOL/L
PROT SERPL-MCNC: 6.9 G/DL
RBC # BLD: 3.85 M/UL
RBC # FLD: 14.5 %
RUBV IGG FLD-ACNC: 6.7 INDEX
RUBV IGG SER-IMP: POSITIVE
SODIUM SERPL-SCNC: 139 MMOL/L
VZV AB TITR SER: POSITIVE
VZV IGG SER IF-ACNC: 909.8 INDEX
WBC # FLD AUTO: 8.11 K/UL

## 2021-01-29 NOTE — HISTORY OF PRESENT ILLNESS
[de-identified] : Nithya is a 30 year old woman here for initial immune evaluation because of recurrent oral thrush and recent aseptic meningitis.\par \par She was admitted to the hospital last month for aseptic meningitis that was attributed to either metronidazole or fluconazole she was given for infections. Over the course of the past several months (Oct 2020- Jan 2020) she has also had recurrent fungal infections with oral thrush and vaginal candidiasis despite escalating treatment.\par \par She was in her usual state of health early fall of 2020. In October 2020 she was given two dose course of Azithromycin fro Chlamydia in October. In November she was diagnosed with bacterial vaginosis and vaginal candidiasis and given metronidazole and fluconazole. 1-2 weeks later she noticed a white film to the back of her throat and saw urgent care who gave her anti fungal lozenges and when seen by her PMD she was given another dose of fluconazole on Dec 9th. She took one pill of fluconazole and 1-2 hours later she felt very warm, flushing, nausea, vomited multiple times, developed very severe headache that was worse on standing upright. Developed a fever overnight and went to the ED the following morning. She took benadryl because she was concerned for allergic reaction with no relief. She also took Excedrin migraine with no relief. Only other medication taken was her regular daily metformin taken at dinner.\par \par Once in the ED an LP showed elevated opening pressure to 49 but negative cultures. She was also given nystatin while in the hospital for oral thrush. She was also being treated with IV antibiotics while waiting for cultures to be negative 48hrs. She was on vancomycin and another drug she does not remember. She was discharged without any antibiotics but continued on nystatin for one week.\par \par No history of frequent infections or other medical problems except for pituitary adenoma that was found and then PCOS with insulin resistance (recent Aic of 5) that she was started on metformin in the summer 2020. No history of oral thrush. She had one prior vaginal yeast infection after antibiotics but it was treated with OTC antifungal treatment x1 several years ago. No history of STIs. She had a false positive RPR but had negative follow up test. HIV test was negative x3.

## 2021-01-29 NOTE — PHYSICAL EXAM
[Alert] : alert [Well Nourished] : well nourished [Healthy Appearance] : healthy appearance [No Acute Distress] : no acute distress [Well Developed] : well developed [Normal Pupil & Iris Size/Symmetry] : normal pupil and iris size and symmetry [No Discharge] : no discharge [No Photophobia] : no photophobia [Sclera Not Icteric] : sclera not icteric [Normal TMs] : both tympanic membranes were normal [Normal Nasal Mucosa] : the nasal mucosa was normal [Normal Outer Ear/Nose] : the ears and nose were normal in appearance [Normal Tonsils] : normal tonsils [No Neck Mass] : no neck mass was observed [No LAD] : no lymphadenopathy [Supple] : the neck was supple [Normal Rate and Effort] : normal respiratory rhythm and effort [No Crackles] : no crackles [No Retractions] : no retractions [Bilateral Audible Breath Sounds] : bilateral audible breath sounds [Normal Rate] : heart rate was normal  [Normal S1, S2] : normal S1 and S2 [No murmur] : no murmur [Regular Rhythm] : with a regular rhythm [Soft] : abdomen soft [Not Tender] : non-tender [Not Distended] : not distended [No HSM] : no hepato-splenomegaly [Normal Cervical Lymph Nodes] : cervical [Skin Intact] : skin intact  [No Rash] : no rash [No Skin Lesions] : no skin lesions [No clubbing] : no clubbing [No Edema] : no edema [No Cyanosis] : no cyanosis [No Motor Deficits] : the motor exam was normal [Normal Mood] : mood was normal [Normal Affect] : affect was normal [Alert, Awake, Oriented as Age-Appropriate] : alert, awake, oriented as age appropriate [Conjunctival Erythema] : no conjunctival erythema [Suborbital Bogginess] : no suborbital bogginess (allergic shiners) [Boggy Nasal Turbinates] : no boggy and/or pale nasal turbinates [Pharyngeal erythema] : no pharyngeal erythema [Posterior Pharyngeal Cobblestoning] : no posterior pharyngeal cobblestoning [Clear Rhinorrhea] : no clear rhinorrhea was seen [Exudate] : no exudate [Wheezing] : no wheezing was heard [Patches] : no patches [de-identified] : faint white/grey film to tongue that does not scrape easily- sample taken

## 2021-01-29 NOTE — REASON FOR VISIT
[Initial Consultation] : an initial consultation for [Immune Evaluation] : immune evaluation [FreeTextEntry2] : recurrent oral thrush, aseptic meningitis

## 2021-01-29 NOTE — REVIEW OF SYSTEMS
[Bad Breath] : bad breath [Oral Thrush] : oral thrush [Headache] : headache [Nl] : Integumentary [Immunizations are up to date] : Immunizations are up to date [Rhinorrhea] : no rhinorrhea [Nasal Congestion] : no nasal congestion [Mouth Sores] : no mouth sores [Sore Throat] : no sore throat [Post Nasal Drip] : no post nasal drip [Sneezing] : no sneezing [FreeTextEntry7] : r [FreeTextEntry8] : recent aseptic meningitis per hpi [de-identified] : recurrent thrush and vaginal candidiasis per hpi

## 2021-01-29 NOTE — CONSULT LETTER
[Dear  ___] : Dear  [unfilled], [Consult Letter:] : I had the pleasure of evaluating your patient, [unfilled]. [Please see my note below.] : Please see my note below. [Consult Closing:] : Thank you very much for allowing me to participate in the care of this patient.  If you have any questions, please do not hesitate to contact me. [Sincerely,] : Sincerely, [DrEsha  ___] : Dr. HOLM [FreeTextEntry2] : LOMBARDI, ADRIAN [FreeTextEntry3] : Marleny Pierre MD\par Fellow, Division of Allergy/Immunology \par [employer] [facility]\par [facility]\par \par

## 2021-01-31 ENCOUNTER — TRANSCRIPTION ENCOUNTER (OUTPATIENT)
Age: 31
End: 2021-01-31

## 2021-02-01 LAB
A-TUMOR NECROSIS FACT SERPL-MCNC: 3.3 PG/ML
G6PD SER-CCNC: 16.2 U/G HGB
IGNF SERPL-MCNC: <4.2 PG/ML
IL10 SERPL-MCNC: 5.3 PG/ML
IL12 SERPL-MCNC: <1.9 PG/ML
IL13 SERPL-MCNC: <1.7 PG/ML
IL17A SERPL-MCNC: <1.4 PG/ML
IL2 SERPL-MCNC: 954.9 PG/ML
IL2 SERPL-MCNC: <2.1 PG/ML
IL4 SERPL-MCNC: <2.2 PG/ML
IL6 SERPL-MCNC: <2 PG/ML
IL8 SERPL-MCNC: <3 PG/ML
INTERLEUKIN 1 BETA: <6.5 PG/ML
INTERLEUKIN 5: 2.7 PG/ML

## 2021-02-02 ENCOUNTER — TRANSCRIPTION ENCOUNTER (OUTPATIENT)
Age: 31
End: 2021-02-02

## 2021-02-02 ENCOUNTER — APPOINTMENT (OUTPATIENT)
Dept: OBGYN | Facility: CLINIC | Age: 31
End: 2021-02-02
Payer: COMMERCIAL

## 2021-02-02 VITALS
BODY MASS INDEX: 24.8 KG/M2 | SYSTOLIC BLOOD PRESSURE: 122 MMHG | HEIGHT: 67 IN | WEIGHT: 158 LBS | DIASTOLIC BLOOD PRESSURE: 66 MMHG | TEMPERATURE: 207.14 F

## 2021-02-02 DIAGNOSIS — G43.909 MIGRAINE, UNSPECIFIED, NOT INTRACTABLE, W/OUT STATUS MIGRAINOSUS: ICD-10-CM

## 2021-02-02 LAB
C TETANI IGG SER-ACNC: 0.73 IU/ML
CH50 SERPL-MCNC: 77 U/ML
IGE SER-MCNC: 89 KU/L
MANNAN BINDING LECTIN (MBL): 595 NG/ML

## 2021-02-02 PROCEDURE — 36415 COLL VENOUS BLD VENIPUNCTURE: CPT

## 2021-02-02 PROCEDURE — 81003 URINALYSIS AUTO W/O SCOPE: CPT | Mod: QW

## 2021-02-02 PROCEDURE — 81025 URINE PREGNANCY TEST: CPT

## 2021-02-02 PROCEDURE — 99203 OFFICE O/P NEW LOW 30 MIN: CPT

## 2021-02-02 PROCEDURE — 99072 ADDL SUPL MATRL&STAF TM PHE: CPT

## 2021-02-02 RX ORDER — FLUOCINONIDE 0.5 MG/ML
0.05 SOLUTION TOPICAL
Qty: 60 | Refills: 0 | Status: DISCONTINUED | COMMUNITY
Start: 2020-10-16 | End: 2021-02-02

## 2021-02-02 RX ORDER — MELATONIN 3 MG
TABLET ORAL
Refills: 0 | Status: ACTIVE | COMMUNITY

## 2021-02-02 NOTE — REVIEW OF SYSTEMS
[Frequency] : frequency [Skin Rash] : skin rash [Headache] : headache [Anxiety] : anxiety [Negative] : Heme/Lymph

## 2021-02-02 NOTE — HISTORY OF PRESENT ILLNESS
[Condoms] : uses condoms [Y] : Patient is sexually active [N] : Patient denies prior pregnancies [Menarche Age: ____] : age at menarche was [unfilled] [No] : Patient does not have concerns regarding sex [Currently Active] : currently active [Men] : men [PapSmeardate] : 11/18/2020 [TextBox_31] : AS PER PT [LMPDate] : 11/22/2021 [PGHxTotal] : 0 [FreeTextEntry1] : 1/22/2021

## 2021-02-02 NOTE — DISCUSSION/SUMMARY
[FreeTextEntry1] : She previously tested positive for Chlamydia. She has no records available at this time. Discussed about obtaining the records and faxing it to me. She expressed understanding. She desires STI screening. STI screening collected today. All questions and concerns were addressed.\par \par

## 2021-02-02 NOTE — END OF VISIT
[FreeTextEntry3] : I, Gaurav Jaswant, acted solely as a scribe for Dr. Velasquez on this date 02/02/2021.\par All medical record entries made by the Scribe were at my, Dr. Velasquez's direction and personally dictated by me on  02/02/2021. I have reviewed the chart and agree that the record accurately reflects my personal performance of the history, physical exam, assessment and plan. I have also personally directed, reviewed, and agreed with the chart.\par

## 2021-02-03 ENCOUNTER — TRANSCRIPTION ENCOUNTER (OUTPATIENT)
Age: 31
End: 2021-02-03

## 2021-02-03 LAB
BILIRUB UR QL STRIP: NORMAL
C DIPHTHERIAE AB SER QL: 0.29 IU/ML
GLUCOSE UR-MCNC: NORMAL
HAV IGM SER QL: NONREACTIVE
HBV CORE IGM SER QL: NONREACTIVE
HBV SURFACE AG SER QL: NONREACTIVE
HCG UR QL: 0.2 EU/DL
HCG UR QL: NEGATIVE
HCV AB SER QL: NONREACTIVE
HCV S/CO RATIO: 0.12 S/CO
HGB UR QL STRIP.AUTO: ABNORMAL
HIV1+2 AB SPEC QL IA.RAPID: NONREACTIVE
HSV 1+2 IGG SER IA-IMP: NEGATIVE
HSV 1+2 IGG SER IA-IMP: NEGATIVE
HSV1 IGG SER QL: <0.01 INDEX
HSV2 IGG SER QL: 0.32 INDEX
KETONES UR-MCNC: NORMAL
LEUKOCYTE ESTERASE UR QL STRIP: NORMAL
NITRITE UR QL STRIP: NORMAL
PH UR STRIP: 7
PROT UR STRIP-MCNC: NORMAL
QUALITY CONTROL: YES
SP GR UR STRIP: 1.02
T PALLIDUM AB SER QL IA: NEGATIVE

## 2021-02-04 LAB — MYELOPEROXIDASE SPEC: POSITIVE

## 2021-02-05 LAB
C TRACH RRNA SPEC QL NAA+PROBE: NOT DETECTED
N GONORRHOEA RRNA SPEC QL NAA+PROBE: NOT DETECTED
SOURCE AMPLIFICATION: NORMAL

## 2021-02-08 LAB
COMPLEMENT, ALTERNATE PATHWAY (AH50): 62
HAEM INFLU B AB SER-MCNC: 4.63 MG/L
LPT PW BLD-NRATE: ABNORMAL
LPT PW BLD-NRATE: NORMAL

## 2021-02-10 LAB
DEPRECATED S PNEUM 1 IGG SER-MCNC: 2.8 MCG/ML
DEPRECATED S PNEUM12 AB SER-ACNC: <0.4 MCG/ML
DEPRECATED S PNEUM14 AB SER-ACNC: <0.4 MCG/ML
DEPRECATED S PNEUM17 IGG SER IA-MCNC: 2.2 MCG/ML
DEPRECATED S PNEUM18 IGG SER IA-MCNC: 1.1 MCG/ML
DEPRECATED S PNEUM19 IGG SER-MCNC: 2.7 MCG/ML
DEPRECATED S PNEUM19 IGG SER-MCNC: 4 MCG/ML
DEPRECATED S PNEUM2 IGG SER-MCNC: 0.7 MCG/ML
DEPRECATED S PNEUM20 IGG SER-MCNC: <0.4 MCG/ML
DEPRECATED S PNEUM22 IGG SER-MCNC: 11.8 MCG/ML
DEPRECATED S PNEUM23 AB SER-ACNC: 11.9 MCG/ML
DEPRECATED S PNEUM3 AB SER-ACNC: 0.7 MCG/ML
DEPRECATED S PNEUM34 IGG SER-MCNC: 0.8 MCG/ML
DEPRECATED S PNEUM4 AB SER-ACNC: 1.3 MCG/ML
DEPRECATED S PNEUM5 IGG SER-MCNC: 1.5 MCG/ML
DEPRECATED S PNEUM6 IGG SER-MCNC: 0.8 MCG/ML
DEPRECATED S PNEUM7 IGG SER-ACNC: 1.8 MCG/ML
DEPRECATED S PNEUM8 AB SER-ACNC: 1.2 MCG/ML
DEPRECATED S PNEUM9 AB SER-ACNC: NORMAL
DEPRECATED S PNEUM9 IGG SER-MCNC: 4.2 MCG/ML
STREPTOCOCCUS PNEUMONIAE SEROTYPE 11A: 0.8 MCG/ML
STREPTOCOCCUS PNEUMONIAE SEROTYPE 15B: 1.1 MCG/ML
STREPTOCOCCUS PNEUMONIAE SEROTYPE 33F: <0.4 MCG/ML

## 2021-02-12 ENCOUNTER — NON-APPOINTMENT (OUTPATIENT)
Age: 31
End: 2021-02-12

## 2021-02-12 DIAGNOSIS — Z00.00 ENCOUNTER FOR GENERAL ADULT MEDICAL EXAMINATION W/OUT ABNORMAL FINDINGS: ICD-10-CM

## 2021-02-26 ENCOUNTER — APPOINTMENT (OUTPATIENT)
Dept: OBGYN | Facility: CLINIC | Age: 31
End: 2021-02-26
Payer: COMMERCIAL

## 2021-02-26 VITALS
BODY MASS INDEX: 24.96 KG/M2 | TEMPERATURE: 207.14 F | HEIGHT: 67 IN | WEIGHT: 159 LBS | SYSTOLIC BLOOD PRESSURE: 106 MMHG | DIASTOLIC BLOOD PRESSURE: 70 MMHG

## 2021-02-26 DIAGNOSIS — Z11.4 ENCOUNTER FOR SCREENING FOR HUMAN IMMUNODEFICIENCY VIRUS [HIV]: ICD-10-CM

## 2021-02-26 DIAGNOSIS — B37.3 CANDIDIASIS OF VULVA AND VAGINA: ICD-10-CM

## 2021-02-26 DIAGNOSIS — A74.9 CHLAMYDIAL INFECTION, UNSPECIFIED: ICD-10-CM

## 2021-02-26 DIAGNOSIS — Z86.19 PERSONAL HISTORY OF OTHER INFECTIOUS AND PARASITIC DISEASES: ICD-10-CM

## 2021-02-26 PROCEDURE — 99072 ADDL SUPL MATRL&STAF TM PHE: CPT

## 2021-02-26 PROCEDURE — 99395 PREV VISIT EST AGE 18-39: CPT

## 2021-02-26 NOTE — HISTORY OF PRESENT ILLNESS
[Menarche Age: ____] : age at menarche was [unfilled] [Mammogramdate] : NEVER [BreastSonogramDate] : NEVER [PapSmeardate] : 01/2020 NEG PER T  [BoneDensityDate] : NEVER [ColonoscopyDate] : NEVER [FreeTextEntry1] : 02/15/2021

## 2021-02-26 NOTE — PLAN
[FreeTextEntry1] : F/U pap.\par \par She accepts GC and chlam cxs but declines serum STD screening.\par \par She declines birth control. She uses condoms. \par \par She will F/U for skin tag removal.

## 2021-02-26 NOTE — COUNSELING
Wound Color?: pink [Breast Self Exam] : breast self exam Wound Diameter In Cm(Optional): 0 [Contraception/ Emergency Contraception/ Safe Sexual Practices] : contraception, emergency contraception, safe sexual practices Body Location Override (Optional): right ankle [STD (testing, results, tx)] : STD (testing, results, tx) Detail Level: Detailed Wound Evaluated By (Optional): Dr. Drew Wound Crusting?: clean Patient To Follow-Up With?: our clinic

## 2021-03-01 LAB
C TRACH RRNA SPEC QL NAA+PROBE: NOT DETECTED
FUNGUS SPEC CULT ORG #8: NORMAL
N GONORRHOEA RRNA SPEC QL NAA+PROBE: NOT DETECTED
SOURCE TP AMPLIFICATION: NORMAL

## 2021-03-03 LAB
CYTOLOGY CVX/VAG DOC THIN PREP: NORMAL
HPV HIGH+LOW RISK DNA PNL CVX: NOT DETECTED

## 2021-03-19 ENCOUNTER — APPOINTMENT (OUTPATIENT)
Dept: OBGYN | Facility: CLINIC | Age: 31
End: 2021-03-19
Payer: COMMERCIAL

## 2021-03-19 VITALS
WEIGHT: 155 LBS | SYSTOLIC BLOOD PRESSURE: 106 MMHG | TEMPERATURE: 206.78 F | BODY MASS INDEX: 24.33 KG/M2 | HEIGHT: 67 IN | DIASTOLIC BLOOD PRESSURE: 72 MMHG

## 2021-03-19 DIAGNOSIS — Z11.3 ENCOUNTER FOR SCREENING FOR INFECTIONS WITH A PREDOMINANTLY SEXUAL MODE OF TRANSMISSION: ICD-10-CM

## 2021-03-19 DIAGNOSIS — Z86.19 PERSONAL HISTORY OF OTHER INFECTIOUS AND PARASITIC DISEASES: ICD-10-CM

## 2021-03-19 PROCEDURE — 99072 ADDL SUPL MATRL&STAF TM PHE: CPT

## 2021-03-19 PROCEDURE — 56605 BIOPSY OF VULVA/PERINEUM: CPT

## 2021-03-19 NOTE — PHYSICAL EXAM
[Appropriately responsive] : appropriately responsive [Alert] : alert [No Acute Distress] : no acute distress [Oriented x3] : oriented x3 [Labia Majora] : normal [Labia Minora] : normal [Normal] : normal [FreeTextEntry1] : B/L skin tags on Perineum.

## 2021-03-19 NOTE — HISTORY OF PRESENT ILLNESS
[Y] : Patient uses contraception [N] : Patient denies prior pregnancies [Menarche Age: ____] : age at menarche was [unfilled] [No] : Patient does not have concerns regarding sex [Currently Active] : currently active [Men] : men [Yes] : Yes [Condoms] : Condoms [TextBox_4] : Pt presents for skin tag removal [PapSmeardate] : 02/26/2021 [TextBox_31] : NORMAL [GonorrheaDate] : 02/26/2021 [TextBox_63] : NEG [ChlamydiaDate] : 02/26/2021 [TextBox_68] :  NEG [HPVDate] : 02/26/2021 [TextBox_78] : NEG [LMPDate] : 03/10/2021 [FreeTextEntry1] : 03/10/2021

## 2021-03-19 NOTE — DISCUSSION/SUMMARY
[FreeTextEntry1] : Skin tag removal done today. Procedural details discussed. Consent obtained. Patient tolerated procedure well without complications. Post procedural expectations and call parameters reviewed. \par \par All questions were answered. Reassurance given. \par \par RTO annually or sooner if needed\par \par

## 2021-03-19 NOTE — PROCEDURE
[Time out performed] : Pre-procedure time out performed.  Patient's name, date of birth and procedure confirmed. [Consent Obtained] : Consent obtained [] : in the Perineum [Local Anesthesia] : local anesthesia [Betadine] : Betadine [Sent to Pathology] : placed in buffered formalin and sent for pathology [Scalpel] : scalpel [Adam's] : Adam's solution [Silver Nitrate] : silver nitrate [Tolerated Well] : the patient tolerated the procedure well [No Complications] : there were no complications [Size of Biopsy Taken: ___ (mm)] : [unfilled]Umm [de-identified] : Lidocaine injected bilaterally  [de-identified] : Bacitracin applied

## 2021-03-19 NOTE — END OF VISIT
[FreeTextEntry3] : I, Khushbu Hall, solely acted as a scribe for Dr. Tayler Velasquez on 03/19/2021 . All medical entries made by the Scribe were at my, Dr. Velasquez's, direction and personally dictated by me on 03/19/2021. I have reviewed the chart and agree that the record accurately reflects my personal performance of the history, physical exam, assessment and plan. I have also personally directed, reviewed and agreed with the chart.

## 2021-03-25 LAB — CORE LAB BIOPSY: NORMAL

## 2021-04-15 ENCOUNTER — APPOINTMENT (OUTPATIENT)
Dept: PEDIATRIC ALLERGY IMMUNOLOGY | Facility: CLINIC | Age: 31
End: 2021-04-15
Payer: COMMERCIAL

## 2021-04-15 VITALS
HEIGHT: 67 IN | WEIGHT: 158.13 LBS | TEMPERATURE: 206.96 F | HEART RATE: 76 BPM | OXYGEN SATURATION: 98 % | BODY MASS INDEX: 24.82 KG/M2

## 2021-04-15 DIAGNOSIS — D72.19 OTHER EOSINOPHILIA: ICD-10-CM

## 2021-04-15 PROCEDURE — 99214 OFFICE O/P EST MOD 30 MIN: CPT | Mod: 25

## 2021-04-15 PROCEDURE — 99072 ADDL SUPL MATRL&STAF TM PHE: CPT

## 2021-04-16 LAB
BASOPHILS # BLD AUTO: 0.03 K/UL
BASOPHILS NFR BLD AUTO: 0.6 %
DEPRECATED KAPPA LC FREE/LAMBDA SER: 1.37 RATIO
EOSINOPHIL # BLD AUTO: 0.09 K/UL
EOSINOPHIL NFR BLD AUTO: 1.7 %
HCT VFR BLD CALC: 37.2 %
HGB BLD-MCNC: 11.7 G/DL
IGA SER QL IEP: 324 MG/DL
IGG SER QL IEP: 1054 MG/DL
IGM SER QL IEP: 105 MG/DL
IMM GRANULOCYTES NFR BLD AUTO: 0.2 %
KAPPA LC CSF-MCNC: 1.02 MG/DL
KAPPA LC SERPL-MCNC: 1.4 MG/DL
LYMPHOCYTES # BLD AUTO: 1.79 K/UL
LYMPHOCYTES NFR BLD AUTO: 33.6 %
MAN DIFF?: NORMAL
MCHC RBC-ENTMCNC: 28.7 PG
MCHC RBC-ENTMCNC: 31.5 GM/DL
MCV RBC AUTO: 91.4 FL
MONOCYTES # BLD AUTO: 0.4 K/UL
MONOCYTES NFR BLD AUTO: 7.5 %
NEUTROPHILS # BLD AUTO: 3 K/UL
NEUTROPHILS NFR BLD AUTO: 56.4 %
PLATELET # BLD AUTO: 283 K/UL
RBC # BLD: 4.07 M/UL
RBC # FLD: 13 %
WBC # FLD AUTO: 5.32 K/UL

## 2021-04-16 NOTE — REASON FOR VISIT
[Routine Follow-Up] : a routine follow-up visit for [Immune Evaluation] : immune evaluation same name as above

## 2021-04-18 LAB
A-TUMOR NECROSIS FACT SERPL-MCNC: 1.8 PG/ML
IGNF SERPL-MCNC: <4.2 PG/ML
IL10 SERPL-MCNC: <2.8 PG/ML
IL12 SERPL-MCNC: <1.9 PG/ML
IL13 SERPL-MCNC: 13.2 PG/ML
IL17A SERPL-MCNC: <1.4 PG/ML
IL2 SERPL-MCNC: 390.4 PG/ML
IL2 SERPL-MCNC: <2.1 PG/ML
IL4 SERPL-MCNC: <2.2 PG/ML
IL6 SERPL-MCNC: <2 PG/ML
IL8 SERPL-MCNC: 3.9 PG/ML
INTERLEUKIN 1 BETA: <6.5 PG/ML
INTERLEUKIN 5: <2.1 PG/ML

## 2021-04-19 NOTE — PHYSICAL EXAM
[Alert] : alert [No Acute Distress] : no acute distress [No LAD] : no lymphadenopathy [Normal Rate] : heart rate was normal  [Soft] : abdomen soft [Not Distended] : not distended [Normal Cervical Lymph Nodes] : cervical [No Rash] : no rash [Alert, Awake, Oriented as Age-Appropriate] : alert, awake, oriented as age appropriate [Normal TMs] : both tympanic membranes were normal [No Thrush] : no thrush [Full ROM with no contractures] : full range of motion with no contractures [No Motor Deficits] : the motor exam was normal [Conjunctival Erythema] : no conjunctival erythema [Suborbital Bogginess] : no suborbital bogginess (allergic shiners) [Pharyngeal erythema] : no pharyngeal erythema [Posterior Pharyngeal Cobblestoning] : no posterior pharyngeal cobblestoning [Clear Rhinorrhea] : no clear rhinorrhea was seen [Exudate] : no exudate [Wheezing] : no wheezing was heard

## 2021-04-19 NOTE — CONSULT LETTER
[Dear  ___] : Dear  [unfilled], [Consult Letter:] : I had the pleasure of evaluating your patient, [unfilled]. [Please see my note below.] : Please see my note below. [This report is provisional, pending the completion of the evaluation.  A final diagnosis and plan will follow.] : This report is provisional, pending the completion of the evaluation.  A final diagnosis and plan will follow. [Consult Closing:] : Thank you very much for allowing me to participate in the care of this patient.  If you have any questions, please do not hesitate to contact me. [Sincerely,] : Sincerely, [FreeTextEntry3] : Malachi Parrish MD\par Fellow, Division of Allergy/Immunology\par [facility]

## 2021-04-19 NOTE — REVIEW OF SYSTEMS
[Fatigue] : fatigue [Fever] : no fever [Nosebleeds] : no epistaxis [Rhinorrhea] : no rhinorrhea [Snoring] : no snoring [Nasal Itching] : no nasal itching [Sore Throat] : no sore throat [Sneezing] : no sneezing [Difficulty Swallowing] : no dysphagia [Vomiting] : no vomiting [Diarrhea] : no diarrhea [Urticaria] : no urticaria [Atopic Dermatitis] : no atopic dermatitis [Swelling] : no swelling [Dec Urine Output] : no oliguria [Recurrent Sinus Infections] : no recurrent sinus infections [Recurrent Ear Infections] : no recurrence or ear infections

## 2021-04-19 NOTE — HISTORY OF PRESENT ILLNESS
[de-identified] : Nithya is a 31 year old with PCOS/insulin resistance who recently has had recurrent fungal infections and hx of aseptic meningitis seen initially on 1/28/2021 with initial evaluation showing the following:\par \par -Preliminary cellular immune evaluation shows elevated eosinophils of unclear etiology and clinical significance. Recommend rechecking in 1 month to monitor, and if persistently elevated and expanding evaluation for possible hypereosinophilic syndrome if appropriate. Otherwise, she has unremarkable lymphocyte counts and prior negative HIV serologies. SHe also has decreased T cell proliferation to tetanus toxoid despite having protective titers. The otherwise normal T cell proliferation to candida and normal mitogen proliferation studies are reassuring. She has elevated IL-2 R, which can be an acute phase reactant, as well as elevated anti-inflammatory IL-10, and minimally elevated IL-5, which is a Th2 cytokine. Consider rechecking in 3-6 months to monitor\par -Preliminary humoral immune evaluation shows minimally elevated kappa light chain of unclear etiology and clinical significance. Recommend rechecking in 3-6 months to monitor. Otherwise, she has normal immunoglobulins and protective titers to a variety of live and inactivated vaccines, which are reassuring.\par -Phagocyte evaluation to date shows a pre activated population in the stimulated cells. Recommend rechecking DHR assay to more definitively rule out chronic granulomatous disease. The normal G6PD and myeloperoxidase stain make G6PD and MPO deficiency less likely. It is also important to note that insulin resistance can cause relative neutrophil dysfunction, but this is less likely with her hemoglobin A1c being 5.\par -Complement evaluation shows normal CH50, MBL, and AH50, which is less consistent with complement deficiency.\par - As second line testing we can consider doing immune deficiency genetic panel from [employer]. Will do benefits investigation for insurance coverage with [employer]. \par \par In the interim, Nithya states she has been well but reports of an intermittent, slight white coating on her tongue that she has noticed, but notably improved than what it was in the past. She endorses some mild fatigue during the past few weeks but otherwise denies any acute complaints, specifically denies fever, chills, chest pain, nausea, vomiting, diarrhea, changes in urination. \par She denies Abx use since last visit.\par She is here today to do follow up labs as well as [employer] PIDD panel.

## 2021-04-19 NOTE — SOCIAL HISTORY
[Apartment] : [unfilled] lives in an apartment  [Dog] : dog [de-identified] : lives alone [FreeTextEntry1] : w [FreeTextEntry2] : works for [employer] [Smokers in Household] : there are no smokers in the home [de-identified] : lived in basement in the past, reports dry floor no known exposure to mold

## 2021-04-21 ENCOUNTER — TRANSCRIPTION ENCOUNTER (OUTPATIENT)
Age: 31
End: 2021-04-21

## 2021-04-22 ENCOUNTER — APPOINTMENT (OUTPATIENT)
Dept: PEDIATRIC ALLERGY IMMUNOLOGY | Facility: CLINIC | Age: 31
End: 2021-04-22
Payer: COMMERCIAL

## 2021-04-22 DIAGNOSIS — G03.0 NONPYOGENIC MENINGITIS: ICD-10-CM

## 2021-04-22 PROCEDURE — 99214 OFFICE O/P EST MOD 30 MIN: CPT | Mod: 95

## 2021-04-26 ENCOUNTER — NON-APPOINTMENT (OUTPATIENT)
Age: 31
End: 2021-04-26

## 2021-04-27 ENCOUNTER — TRANSCRIPTION ENCOUNTER (OUTPATIENT)
Age: 31
End: 2021-04-27

## 2021-05-02 PROBLEM — G03.0 ASEPTIC MENINGITIS: Status: ACTIVE | Noted: 2020-12-22

## 2021-05-02 NOTE — CONSULT LETTER
[Dear  ___] : Dear  [unfilled], [Consult Letter:] : I had the pleasure of evaluating your patient, [unfilled]. [Please see my note below.] : Please see my note below. [This report is provisional, pending the completion of the evaluation.  A final diagnosis and plan will follow.] : This report is provisional, pending the completion of the evaluation.  A final diagnosis and plan will follow. [Consult Closing:] : Thank you very much for allowing me to participate in the care of this patient.  If you have any questions, please do not hesitate to contact me. [Sincerely,] : Sincerely, [FreeTextEntry3] : Akila Paz MD\par Attending Physician \par Division of Allergy/Immunology \par [facility] \par \par  of Medicine and Pediatrics\par [facility] \par \par [address]\[place] [address]\par Tel: [phone]\par Fax: [phone]\par Email: [email or web address]\par \par \par \par

## 2021-05-02 NOTE — HISTORY OF PRESENT ILLNESS
[de-identified] : Nithya is a 31 year old with PCOS/insulin resistance who recently has had recurrent fungal infections and hx of aseptic meningitis seen initially on 1/28/2021 with initial evaluation showing the following:\par \par She presents for initial evaluation to discuss COVID vaccine.\par 1. Do you have a history of a severe allergic reaction to an injectable medication (intravenous, intramuscular, or subcutaneous)?\par NO\par 2. Do you have a history of a severe allergic reaction to a prior vaccine?\par NO - tolerated primary series, got flu shot once in her life\par 3. Do you have a history of a severe allergic reaction to another allergen (e.g., food, venom, or latex)?\par NO\par 4. Do you have a history of a severe allergic reaction to polyethylene glycol (PEG), a polysorbate or polyoxyl 35 castor oil (e.g. paclitaxel) containing injectable or vaccine?\par Never had colonoscopy. Never took Miralax.\par  \par Hx of atopic dermatitis and had patch test that was positive to cobalt.\par \par In November had a yeast infection and BV at the same time. Started with metrogel treatment as well as fluconazole pill. 4-5 later she developed oral thrush.  Had a telehealth visit and was prescribed clotrimazole troches.  No improvement after 1 week. Then given fluconazole again. later that day she developed symptoms.   Then felt hot, had flushing, migraine headache, emesis, then stiff neck, photo and phonophobia, fever, weakness with standing, lightheadedness. Went to ED, elevated opening pressure (49).  Started IV antibiotic. \par \par Hx of allergic rhinitis. \par April 2020:\par -Preliminary cellular immune evaluation shows elevated eosinophils of unclear etiology and clinical significance. Recommend rechecking in 1 month to monitor, and if persistently elevated and expanding evaluation for possible hypereosinophilic syndrome if appropriate. Otherwise, she has unremarkable lymphocyte counts and prior negative HIV serologies. SHe also has decreased T cell proliferation to tetanus toxoid despite having protective titers. The otherwise normal T cell proliferation to candida and normal mitogen proliferation studies are reassuring. She has elevated IL-2 R, which can be an acute phase reactant, as well as elevated anti-inflammatory IL-10, and minimally elevated IL-5, which is a Th2 cytokine. Consider rechecking in 3-6 months to monitor\par -Preliminary humoral immune evaluation shows minimally elevated kappa light chain of unclear etiology and clinical significance. Recommend rechecking in 3-6 months to monitor. Otherwise, she has normal immunoglobulins and protective titers to a variety of live and inactivated vaccines, which are reassuring.\par -Phagocyte evaluation to date shows a pre activated population in the stimulated cells. Recommend rechecking DHR assay to more definitively rule out chronic granulomatous disease. The normal G6PD and myeloperoxidase stain make G6PD and MPO deficiency less likely. It is also important to note that insulin resistance can cause relative neutrophil dysfunction, but this is less likely with her hemoglobin A1c being 5.\par -Complement evaluation shows normal CH50, MBL, and AH50, which is less consistent with complement deficiency.\par - As second line testing we can consider doing immune deficiency genetic panel from [employer]. Will do benefits investigation for insurance coverage with [employer]. \par \par In the interim, Nithya states she has been well but reports of an intermittent, slight white coating on her tongue that she has noticed, but notably improved than what it was in the past. She endorses some mild fatigue during the past few weeks but otherwise denies any acute complaints, specifically denies fever, chills, chest pain, nausea, vomiting, diarrhea, changes in urination. \par She denies Abx use since last visit.\par She is here today to do follow up labs as well as [employer] PIDD panel.

## 2021-05-02 NOTE — SOCIAL HISTORY
[Apartment] : [unfilled] lives in an apartment  [Dog] : dog [de-identified] : lives alone [FreeTextEntry2] : works for [employer] [Smokers in Household] : there are no smokers in the home [de-identified] : lived in basement in the past, reports dry floor no known exposure to mold

## 2021-05-04 ENCOUNTER — TRANSCRIPTION ENCOUNTER (OUTPATIENT)
Age: 31
End: 2021-05-04

## 2021-05-04 ENCOUNTER — NON-APPOINTMENT (OUTPATIENT)
Age: 31
End: 2021-05-04

## 2021-05-05 ENCOUNTER — TRANSCRIPTION ENCOUNTER (OUTPATIENT)
Age: 31
End: 2021-05-05

## 2021-05-17 ENCOUNTER — APPOINTMENT (OUTPATIENT)
Dept: PEDIATRIC ALLERGY IMMUNOLOGY | Facility: CLINIC | Age: 31
End: 2021-05-17
Payer: COMMERCIAL

## 2021-05-17 DIAGNOSIS — R89.8 OTHER ABNORMAL FINDINGS IN SPECIMENS FROM OTHER ORGANS, SYSTEMS AND TISSUES: ICD-10-CM

## 2021-05-17 PROCEDURE — 99213 OFFICE O/P EST LOW 20 MIN: CPT | Mod: 95

## 2021-05-17 NOTE — PHYSICAL EXAM
[Alert] : alert [No Acute Distress] : no acute distress [Normal Mood] : mood was normal [Alert, Awake, Oriented as Age-Appropriate] : alert, awake, oriented as age appropriate

## 2021-05-17 NOTE — CONSULT LETTER
[Dear  ___] : Dear  [unfilled], [Courtesy Letter:] : I had the pleasure of seeing your patient, [unfilled], in my office today. [Please see my note below.] : Please see my note below. [Sincerely,] : Sincerely, [FreeTextEntry3] : Quinn Steinberg III  MPH, MD, PhD, FACP, FACAAI, FAAAAI \par , Departments of Medicine and Pediatrics \par [facility] [facility] \par , [facility] \par Attending Physician, Division of Allergy & Immunology [facility]\par \par \par

## 2021-05-17 NOTE — DATA REVIEWED
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 In addition, several NOD2 variants have been associated with an increased risk of Crohn's disease (PMID: 34525138).\par Three common variants in NOD2: c.2104C>T (p.Bzk573Tep), c.2722G>C (p.Nep628Jhf), and c.3019dupC (p.Nau8553Nhrvu*2), have been reported to confer an increased risk of Crohn's disease in population studies (PMID: 37017348, 08729601, 29049691, 57110635). These variants are present in 1-3% of the population and are associated with approximately a 2- to 4-fold increased risk in the heterozygous state and approximately\par a 7- to 9-fold increased risk in the homozygous or compound heterozygous state (see Variant Details below) (PMID: 14501481). 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 In addition, the TREX1 gene has preliminary evidence supporting a correlation with autosomal dominant susceptibility to systemic lupus erythematosus (SLE) (MedGen UID: 6146; PMID: 35156038).\par -This individual is a carrier for autosomal recessive TREX1-related conditions. This specific variant has not been reported to confer risk for autosomal dominant TREX1-related conditions. This result is insufficient to cause autosomal recessive TREX1-related conditions; however, carrier status does impact reproductive risk.\par -AGS1 is a  onset encephalopathy typically characterized by cerebral atrophy, intracranial calcifications, leukodystrophy, and chronic cerebrospinal fluid lymphocytosis with elevated alpha-interferon (PMID: 37127651, 07490027, 48861673). Affected infants typically present with jitteriness, poor feeding, seizures, hepatosplenomegaly and thrombocytopenia with anemia requiring transfusion (PMID: 83296635). While typically associated with autosomal recessive inheritance, affected individuals with heterozygous TREX1 mutations have been reported (PMID: 51695141). CHBL1 is a cutaneous form of lupus erythematosus that typically presents in early childhood (PMID: 81015018, 83360065). Symptoms include painful, cold- and wet-induced skin lesions primarily affecting acral areas (PMID: 41593805, 27581103). RVCL is an adult-onset neurovascular condition characterized by central nervous system degeneration and retinal vasculopathy which results in cognitive decline, stroke, progressive loss of vision, and motor impairment (PMID: 59865915, 37315797).\par -Biological relatives have a chance of being a carrier for or being at risk for autosomal recessive TREX1-related conditions. Testing should be considered if clinically appropriate. The chance of having a child with autosomal recessive TREX1-related conditions depends on the carrier state of this individual's partner.\par \par 3) She was found to be HETEROZYGOUS for VARIANTS of UNCERTAIN SIGNIFICANCE In the following genes:\par a) c.139G>A (p.Dsv98Ggr) in GTF2H5.\par -The GTF2H5 gene is associated with autosomal recessive trichothiodystrophy (TTD) (MedGen UID: 059288).\par \par b) c.338C>T (p.Eje609Jom) in IL36RN.\par -The IL36RN gene is associated with autosomal recessive deficiency of interleukin-36 receptor antagonist (DITRA) (MedGen UID: 340397).\par \par c) c.1501G>T (p.Foi105Izp) in PRKCD.\par -The PRKCD gene is associated with autosomal recessive PRKC delta deficiency (MedGen UID: 515301).\par \par d) c.624G>A (p.Mym347Wqv) in PRKDC.\par -The PRKDC gene is associated with autosomal recessive severe combined immunodeficiency due to DNA PKcs deficiency (MedGen UID: 107046).\par \par e) c.460G>A (p.Bda485Jwl) in SCO2.\par -The SCO2 gene is associated with autosomal recessive cardioencephalomyopathy due to mitochondrial complex IV deficiency (MedGen UID: 245271). Additionally, there is preliminary evidence supporting a correlation with autosomal recessive Charcot-Fatoumata-Tooth disease (PMID: 43189961) and fatal infantile hyperthermia (PMID: 46540068).\par \par f) c.532C>A (p.Jcn232Abq) in TAPBP.\par -The TAPBP gene currently has no well-established disease association; however, there is preliminary evidence supporting a correlation with autosomal recessive hereditary major histocompatibility complex (MHC) class I deficiency (PMID: 41820370).\par \par g) c.94613D>T (p.Sen4413Tan) in VPS13B.\par -The VPS13B gene is associated with autosomal recessive Sewell syndrome (MedGen UID: 12987).\par \par

## 2021-05-17 NOTE — HISTORY OF PRESENT ILLNESS
[de-identified] : 31 year old with PCOS/insulin resistance who recently has had recurrent fungal infections and hx of aseptic meningitis. Last seen 4/2021.\par here today to discuss results.\par since last visit, she has been doing well. She denies any infections or Abx use. \par Her immune work up to date:\par -Preliminary cellular immune evaluation showed elevated eosinophils of unclear etiology and clinical significance. On recheck eosinophilia has resolved.. Otherwise, she has unremarkable lymphocyte counts and prior negative HIV serologies. SHe also has decreased T cell proliferation to tetanus toxoid despite having protective titers. The otherwise normal T cell proliferation to candida and normal mitogen proliferation studies are reassuring. She has elevated IL-2 R, which can be an acute phase reactant, as well as elevated anti-inflammatory IL-10, and minimally elevated IL-5, which is a Th2 cytokine. On recheck, these have normalized, but she now has elevated IL-13 and IL-8 of unclear etiology and clinical significance. Recommend rechecking in 6 months to monitor.\par -Preliminary humoral immune evaluation showed minimally elevated kappa light chain of unclear etiology and clinical significance. On recheck, these have normalized. Otherwise, she has normal immunoglobulins and protective titers to a variety of live and inactivated vaccines, which are reassuring. \par -Phagocyte evaluation to date showed a pre activated population in the stimulated cells. Repeat DHR was normal.. The prior normal G6PD and myeloperoxidase stain make G6PD and MPO deficiency less likely. It is also important to note that insulin resistance can cause relative neutrophil dysfunction, but this is less likely with her hemoglobin A1c being 5.\par

## 2021-05-17 NOTE — END OF VISIT
[Time Spent: ___ minutes] : I have spent [unfilled] minutes of time on the encounter. [FreeTextEntry3] : Verbal consent was obtained from patient for telehealth services due to the COVID-19 pandemic. Audio and video communication were conducted via the [employer] platform. The patient understands the risks of these platforms and limitations of telemedicine with regards to diagnosis and treatment without the ability to perform a thorough physical examination.\par

## 2021-05-19 ENCOUNTER — TRANSCRIPTION ENCOUNTER (OUTPATIENT)
Age: 31
End: 2021-05-19

## 2021-08-04 ENCOUNTER — TRANSCRIPTION ENCOUNTER (OUTPATIENT)
Age: 31
End: 2021-08-04

## 2021-08-25 ENCOUNTER — APPOINTMENT (OUTPATIENT)
Dept: DISASTER EMERGENCY | Facility: OTHER | Age: 31
End: 2021-08-25

## 2021-09-15 ENCOUNTER — APPOINTMENT (OUTPATIENT)
Dept: DISASTER EMERGENCY | Facility: OTHER | Age: 31
End: 2021-09-15

## 2022-03-02 ENCOUNTER — APPOINTMENT (OUTPATIENT)
Dept: OBGYN | Facility: CLINIC | Age: 32
End: 2022-03-02

## 2022-04-12 ENCOUNTER — APPOINTMENT (OUTPATIENT)
Dept: OBGYN | Facility: CLINIC | Age: 32
End: 2022-04-12
Payer: COMMERCIAL

## 2022-04-12 VITALS
BODY MASS INDEX: 26.68 KG/M2 | DIASTOLIC BLOOD PRESSURE: 68 MMHG | HEIGHT: 67 IN | SYSTOLIC BLOOD PRESSURE: 118 MMHG | WEIGHT: 170 LBS

## 2022-04-12 DIAGNOSIS — Z78.9 OTHER SPECIFIED HEALTH STATUS: ICD-10-CM

## 2022-04-12 DIAGNOSIS — Z13.29 ENCOUNTER FOR SCREENING FOR OTHER SUSPECTED ENDOCRINE DISORDER: ICD-10-CM

## 2022-04-12 DIAGNOSIS — Z13.228 ENCOUNTER FOR SCREENING FOR OTHER SUSPECTED ENDOCRINE DISORDER: ICD-10-CM

## 2022-04-12 DIAGNOSIS — Z13.0 ENCOUNTER FOR SCREENING FOR OTHER SUSPECTED ENDOCRINE DISORDER: ICD-10-CM

## 2022-04-12 LAB
BILIRUB UR QL STRIP: NORMAL
GLUCOSE UR-MCNC: NORMAL
HCG UR QL: 0.2 EU/DL
HCG UR QL: NEGATIVE
HGB UR QL STRIP.AUTO: ABNORMAL
KETONES UR-MCNC: NORMAL
LEUKOCYTE ESTERASE UR QL STRIP: NORMAL
NITRITE UR QL STRIP: NORMAL
PH UR STRIP: 6
PROT UR STRIP-MCNC: NORMAL
QUALITY CONTROL: YES
SP GR UR STRIP: <=1.005

## 2022-04-12 PROCEDURE — 81025 URINE PREGNANCY TEST: CPT

## 2022-04-12 PROCEDURE — 99395 PREV VISIT EST AGE 18-39: CPT

## 2022-04-12 PROCEDURE — 81003 URINALYSIS AUTO W/O SCOPE: CPT | Mod: QW

## 2022-04-12 NOTE — HISTORY OF PRESENT ILLNESS
[Gonorrhea test offered] : Gonorrhea test offered [Chlamydia test offered] : Chlamydia test offered [HPV test offered] : HPV test offered [N] : Patient denies prior pregnancies [Menarche Age: ____] : age at menarche was [unfilled] [No] : Patient does not have concerns regarding sex [Currently Active] : currently active [Y] : Patient uses contraception [Condoms] : uses condoms [TextBox_4] : Pt presents for her annual exam [PapSmeardate] : 02/26/21 [TextBox_31] : neg [GonorrheaDate] : 02/26/21 [TextBox_63] : neg [ChlamydiaDate] : 02/26/21 [TextBox_68] : neg [HPVDate] : 02/26/21 [TextBox_78] : neg [LMPDate] : 03/28/22 [MensesFreq] : 28-30 [MensesLength] : 5-8 [PGHxTotal] : 0 [FreeTextEntry1] : 03/28/22

## 2022-04-12 NOTE — DISCUSSION/SUMMARY
[FreeTextEntry1] : Pap and STD testing done today\par \par Reviewed safe sex practices\par \par FU annually or sooner if needed

## 2022-04-12 NOTE — PHYSICAL EXAM

## 2022-04-13 LAB
HIV1+2 AB SPEC QL IA.RAPID: NONREACTIVE
HPV HIGH+LOW RISK DNA PNL CVX: NOT DETECTED
SOURCE AMPLIFICATION: NORMAL
T PALLIDUM AB SER QL IA: NEGATIVE
T VAGINALIS RRNA SPEC QL NAA+PROBE: NOT DETECTED

## 2022-04-14 LAB
C TRACH RRNA SPEC QL NAA+PROBE: NOT DETECTED
HAV IGM SER QL: NONREACTIVE
HBV CORE IGM SER QL: NONREACTIVE
HBV SURFACE AG SER QL: NONREACTIVE
HCV AB SER QL: NONREACTIVE
HCV S/CO RATIO: 0.11 S/CO
N GONORRHOEA RRNA SPEC QL NAA+PROBE: NOT DETECTED
SOURCE AMPLIFICATION: NORMAL

## 2022-04-19 LAB — CYTOLOGY CVX/VAG DOC THIN PREP: NORMAL

## 2022-07-05 ENCOUNTER — NON-APPOINTMENT (OUTPATIENT)
Age: 32
End: 2022-07-05

## 2023-05-24 ENCOUNTER — NON-APPOINTMENT (OUTPATIENT)
Age: 33
End: 2023-05-24

## 2023-05-24 ENCOUNTER — APPOINTMENT (OUTPATIENT)
Dept: OBGYN | Facility: CLINIC | Age: 33
End: 2023-05-24
Payer: COMMERCIAL

## 2023-05-24 VITALS
WEIGHT: 170 LBS | BODY MASS INDEX: 26.68 KG/M2 | SYSTOLIC BLOOD PRESSURE: 130 MMHG | HEIGHT: 67 IN | DIASTOLIC BLOOD PRESSURE: 79 MMHG

## 2023-05-24 DIAGNOSIS — Z12.4 ENCOUNTER FOR SCREENING FOR MALIGNANT NEOPLASM OF CERVIX: ICD-10-CM

## 2023-05-24 DIAGNOSIS — Z01.419 ENCOUNTER FOR GYNECOLOGICAL EXAMINATION (GENERAL) (ROUTINE) W/OUT ABNORMAL FINDINGS: ICD-10-CM

## 2023-05-24 DIAGNOSIS — Z11.3 ENCOUNTER FOR SCREENING FOR INFECTIONS WITH A PREDOMINANTLY SEXUAL MODE OF TRANSMISSION: ICD-10-CM

## 2023-05-24 DIAGNOSIS — T50.905A ADVERSE EFFECT OF UNSPECIFIED DRUGS, MEDICAMENTS AND BIOLOGICAL SUBSTANCES, INITIAL ENCOUNTER: ICD-10-CM

## 2023-05-24 DIAGNOSIS — T50.905D ADVERSE EFFECT OF UNSPECIFIED DRUGS, MEDICAMENTS AND BIOLOGICAL SUBSTANCES, SUBSEQUENT ENCOUNTER: ICD-10-CM

## 2023-05-24 PROCEDURE — 99213 OFFICE O/P EST LOW 20 MIN: CPT | Mod: 25

## 2023-05-24 PROCEDURE — 99395 PREV VISIT EST AGE 18-39: CPT

## 2023-05-24 PROCEDURE — 36415 COLL VENOUS BLD VENIPUNCTURE: CPT

## 2023-05-24 RX ORDER — CABERGOLINE 0.5 MG/1
0.5 TABLET ORAL
Qty: 8 | Refills: 0 | Status: COMPLETED | COMMUNITY
Start: 2020-09-23 | End: 2023-05-24

## 2023-05-28 NOTE — HISTORY OF PRESENT ILLNESS
[Y] : Patient is sexually active [N] : Patient denies prior pregnancies [Menarche Age: ____] : age at menarche was [unfilled] [No] : Patient does not have concerns regarding sex [Currently Active] : currently active [PapSmeardate] : 04/12/22 [TextBox_31] : NEG [HIVDate] : 04/12/22 [TextBox_53] : NEG [SyphilisDate] : 04/12/22 [TextBox_58] : NEG [GonorrheaDate] : 04/12/22 [TextBox_63] : NEG [ChlamydiaDate] : 04/12/22 [TextBox_68] : NEG [TrichomonasDate] : 04/12/22 [TextBox_73] : NEG [HPVDate] : 04/12/22 [TextBox_78] : NEG [HepatitisBDate] : 04/12/22 [HepatitisCDate] : 04/12/22 [LMPDate] : 05/06/23 [PGHxTotal] : 0 [FreeTextEntry1] : 05/06/23

## 2023-05-28 NOTE — DISCUSSION/SUMMARY
[FreeTextEntry1] : Benign breast and pelvic exam. Pap done today. Declines full STI testing as well as all birth control options. \par \par Self-breast exam reviewed.\par \par We discussed my recommendation for preconception testing and to take prenatal vitamins OTC as she is thinking about conceiving in the near future. We also addressed my recommendation to continue taking Metformin for her diagnosis of PCOS. Patient agrees to blood work. Preconception, Genetic, and STI panels drawn today. She is aware that the genetic panel could cost her out-of-pocket $250. \par \par She will follow up annually and as needed.

## 2023-05-28 NOTE — END OF VISIT
[FreeTextEntry3] : I, Eve Traore solely acted as scribe for Dr. Tayler Velasquez on 05/24/2023 \par All medical entries made by the Scribe were at my, Dr. Velasquez’s, direction and personally\par dictated by me on 05/24/2023 . I have reviewed the chart and agree that the record\par accurately reflects my personal performance of the history, physical exam, assessment and plan. I\par have also personally directed, reviewed, and agreed with the chart.

## 2023-05-29 LAB
CYTOLOGY CVX/VAG DOC THIN PREP: NORMAL
HAV IGM SER QL: NONREACTIVE
HBV CORE IGM SER QL: NONREACTIVE
HBV SURFACE AG SER QL: NONREACTIVE
HCV AB SER QL: NONREACTIVE
HCV S/CO RATIO: 0.17 S/CO
HIV1+2 AB SPEC QL IA.RAPID: NONREACTIVE
HPV HIGH+LOW RISK DNA PNL CVX: NOT DETECTED
T PALLIDUM AB SER QL IA: NEGATIVE

## 2023-06-07 LAB
C TRACH RRNA SPEC QL NAA+PROBE: NOT DETECTED
ESTIMATED AVERAGE GLUCOSE: 108 MG/DL
HBA1C MFR BLD HPLC: 5.4 %
HGB A MFR BLD: 97.6 %
HGB A2 MFR BLD: 2.4 %
HGB FRACT BLD-IMP: NORMAL
MEV IGG FLD QL IA: >300 AU/ML
MEV IGG+IGM SER-IMP: POSITIVE
MUV AB SER-ACNC: POSITIVE
MUV IGG SER QL IA: 101 AU/ML
N GONORRHOEA RRNA SPEC QL NAA+PROBE: NOT DETECTED
RUBV IGG FLD-ACNC: 7.3 INDEX
RUBV IGG SER-IMP: POSITIVE
SOURCE AMPLIFICATION: NORMAL
SOURCE TP AMPLIFICATION: NORMAL
T VAGINALIS RRNA SPEC QL NAA+PROBE: NOT DETECTED
TSH SERPL-ACNC: 1.73 UIU/ML
VZV AB TITR SER: POSITIVE
VZV IGG SER IF-ACNC: 911.2 INDEX

## 2023-07-03 ENCOUNTER — TRANSCRIPTION ENCOUNTER (OUTPATIENT)
Age: 33
End: 2023-07-03

## 2023-07-03 LAB
BASOPHILS # BLD AUTO: 0.07 K/UL
BASOPHILS NFR BLD AUTO: 0.7 %
EOSINOPHIL # BLD AUTO: 0.04 K/UL
EOSINOPHIL NFR BLD AUTO: 0.4 %
HCT VFR BLD CALC: 34.4 %
HGB BLD-MCNC: 10.6 G/DL
IMM GRANULOCYTES NFR BLD AUTO: 0.3 %
LYMPHOCYTES # BLD AUTO: 2.93 K/UL
LYMPHOCYTES NFR BLD AUTO: 30.3 %
MAN DIFF?: NORMAL
MCHC RBC-ENTMCNC: 25.8 PG
MCHC RBC-ENTMCNC: 30.8 GM/DL
MCV RBC AUTO: 83.7 FL
MONOCYTES # BLD AUTO: 0.75 K/UL
MONOCYTES NFR BLD AUTO: 7.8 %
NEUTROPHILS # BLD AUTO: 5.85 K/UL
NEUTROPHILS NFR BLD AUTO: 60.5 %
PLATELET # BLD AUTO: 332 K/UL
RBC # BLD: 4.11 M/UL
RBC # FLD: 15.4 %
WBC # FLD AUTO: 9.67 K/UL

## 2023-07-14 ENCOUNTER — NON-APPOINTMENT (OUTPATIENT)
Age: 33
End: 2023-07-14

## 2023-07-24 ENCOUNTER — APPOINTMENT (OUTPATIENT)
Dept: OBGYN | Facility: CLINIC | Age: 33
End: 2023-07-24
Payer: COMMERCIAL

## 2023-07-24 ENCOUNTER — ASOB RESULT (OUTPATIENT)
Age: 33
End: 2023-07-24

## 2023-07-24 ENCOUNTER — APPOINTMENT (OUTPATIENT)
Dept: ANTEPARTUM | Facility: CLINIC | Age: 33
End: 2023-07-24
Payer: COMMERCIAL

## 2023-07-24 VITALS
DIASTOLIC BLOOD PRESSURE: 72 MMHG | BODY MASS INDEX: 26.68 KG/M2 | WEIGHT: 170 LBS | SYSTOLIC BLOOD PRESSURE: 116 MMHG | TEMPERATURE: 206.6 F | HEIGHT: 67 IN

## 2023-07-24 DIAGNOSIS — Z11.4 ENCOUNTER FOR SCREENING FOR HUMAN IMMUNODEFICIENCY VIRUS [HIV]: ICD-10-CM

## 2023-07-24 DIAGNOSIS — Z11.59 ENCOUNTER FOR SCREENING FOR OTHER VIRAL DISEASES: ICD-10-CM

## 2023-07-24 DIAGNOSIS — E28.2 POLYCYSTIC OVARIAN SYNDROME: ICD-10-CM

## 2023-07-24 DIAGNOSIS — Q51.3 BICORNATE UTERUS: ICD-10-CM

## 2023-07-24 DIAGNOSIS — Z11.8 ENCOUNTER FOR SCREENING FOR OTHER INFECTIOUS AND PARASITIC DISEASES: ICD-10-CM

## 2023-07-24 DIAGNOSIS — Z13.71 ENCOUNTER FOR NONPROCREATIVE SCREENING FOR GENETIC DISEASE CARRIER STATUS: ICD-10-CM

## 2023-07-24 DIAGNOSIS — Z01.419 ENCOUNTER FOR GYNECOLOGICAL EXAMINATION (GENERAL) (ROUTINE) W/OUT ABNORMAL FINDINGS: ICD-10-CM

## 2023-07-24 DIAGNOSIS — Z01.411 ENCOUNTER FOR GYNECOLOGICAL EXAMINATION (GENERAL) (ROUTINE) WITH ABNORMAL FINDINGS: ICD-10-CM

## 2023-07-24 DIAGNOSIS — Z13.0 ENCOUNTER FOR SCREENING FOR DISEASES OF THE BLOOD AND BLOOD-FORMING ORGANS AND CERTAIN DISORDERS INVOLVING THE IMMUNE MECHANISM: ICD-10-CM

## 2023-07-24 DIAGNOSIS — Z13.1 ENCOUNTER FOR SCREENING FOR DIABETES MELLITUS: ICD-10-CM

## 2023-07-24 LAB
BILIRUB UR QL STRIP: NORMAL
GLUCOSE UR-MCNC: NORMAL
HCG UR QL: 0.2 EU/DL
HGB UR QL STRIP.AUTO: ABNORMAL
KETONES UR-MCNC: 40
LEUKOCYTE ESTERASE UR QL STRIP: NORMAL
NITRITE UR QL STRIP: NORMAL
PH UR STRIP: 5.5
PROT UR STRIP-MCNC: NORMAL
SP GR UR STRIP: 1.03

## 2023-07-24 PROCEDURE — 76830 TRANSVAGINAL US NON-OB: CPT

## 2023-07-24 PROCEDURE — 99214 OFFICE O/P EST MOD 30 MIN: CPT

## 2023-07-24 PROCEDURE — 81003 URINALYSIS AUTO W/O SCOPE: CPT | Mod: QW

## 2023-07-26 ENCOUNTER — OUTPATIENT (OUTPATIENT)
Dept: OUTPATIENT SERVICES | Facility: HOSPITAL | Age: 33
LOS: 1 days | End: 2023-07-26
Payer: COMMERCIAL

## 2023-07-26 ENCOUNTER — APPOINTMENT (OUTPATIENT)
Dept: MRI IMAGING | Facility: CLINIC | Age: 33
End: 2023-07-26
Payer: COMMERCIAL

## 2023-07-26 DIAGNOSIS — Q51.3 BICORNATE UTERUS: ICD-10-CM

## 2023-07-26 PROCEDURE — A9585: CPT

## 2023-07-26 PROCEDURE — 72197 MRI PELVIS W/O & W/DYE: CPT

## 2023-07-26 PROCEDURE — 72197 MRI PELVIS W/O & W/DYE: CPT | Mod: 26

## 2023-07-26 NOTE — HISTORY OF PRESENT ILLNESS
[Y] : Patient is sexually active [N] : Patient denies prior pregnancies [Menarche Age: ____] : age at menarche was [unfilled] [No] : Patient does not have concerns regarding sex [Currently Active] : currently active [Men] : men [PapSmeardate] : 05/24/23 [TextBox_31] : NEG [GonorrheaDate] : 05/24/23 [TextBox_63] : NEG [ChlamydiaDate] : 05/24/23 [TextBox_68] : NEG [TrichomonasDate] : 05/24/23 [TextBox_73] : NEG [HPVDate] : 05/24/23 [TextBox_78] : NEG [LMPDate] : 07/05/23 [PGHxTotal] : 0 [FreeTextEntry1] : 07/05/23

## 2023-07-26 NOTE — END OF VISIT
[FreeTextEntry3] : I, Eve Traore solely acted as scribe for Dr. Tayler Velasquez on 07/24/2023 \par All medical entries made by the Scribe were at my, Dr. Velasquez’s, direction and personally\par dictated by me on 07/24/2023 . I have reviewed the chart and agree that the record\par accurately reflects my personal performance of the history, physical exam, assessment and plan. I\par have also personally directed, reviewed, and agreed with the chart.

## 2023-09-05 ENCOUNTER — NON-APPOINTMENT (OUTPATIENT)
Age: 33
End: 2023-09-05

## 2023-11-29 ENCOUNTER — APPOINTMENT (OUTPATIENT)
Dept: OBGYN | Facility: CLINIC | Age: 33
End: 2023-11-29

## 2024-03-12 NOTE — CHART NOTE - NSCHARTNOTESELECT_GEN_ALL_CORE
Condition:: History and physical
Event Note
Please Describe Your Condition:: Pt presents for history and physical prior to Mohs surgery.

## 2024-06-07 ENCOUNTER — APPOINTMENT (OUTPATIENT)
Dept: OBGYN | Facility: CLINIC | Age: 34
End: 2024-06-07
Payer: COMMERCIAL

## 2024-06-07 ENCOUNTER — NON-APPOINTMENT (OUTPATIENT)
Age: 34
End: 2024-06-07

## 2024-06-07 VITALS
HEIGHT: 67 IN | WEIGHT: 168 LBS | SYSTOLIC BLOOD PRESSURE: 110 MMHG | BODY MASS INDEX: 26.37 KG/M2 | DIASTOLIC BLOOD PRESSURE: 70 MMHG

## 2024-06-07 DIAGNOSIS — Z31.69 ENCOUNTER FOR OTHER GENERAL COUNSELING AND ADVICE ON PROCREATION: ICD-10-CM

## 2024-06-07 DIAGNOSIS — N90.89 OTHER SPECIFIED NONINFLAMMATORY DISORDERS OF VULVA AND PERINEUM: ICD-10-CM

## 2024-06-07 DIAGNOSIS — Z01.419 ENCOUNTER FOR GYNECOLOGICAL EXAMINATION (GENERAL) (ROUTINE) W/OUT ABNORMAL FINDINGS: ICD-10-CM

## 2024-06-07 PROCEDURE — 99395 PREV VISIT EST AGE 18-39: CPT

## 2024-06-07 PROCEDURE — 99459 PELVIC EXAMINATION: CPT

## 2024-06-07 NOTE — HISTORY OF PRESENT ILLNESS
[Y] : Patient is sexually active [N] : Patient denies prior pregnancies [Menarche Age: ____] : age at menarche was [unfilled] [No] : Patient does not have concerns regarding sex [Currently Active] : currently active [Men] : men [PapSmeardate] : 05/24/2023 [TextBox_31] : NEGATIVE [HIVDate] : 05/24/2023 [TextBox_53] : NEGATIVE [SyphilisDate] : 05/24/2023 [TextBox_58] : NEGATIVE [GonorrheaDate] : 05/24/2023 [TextBox_63] : NEGATIVE [ChlamydiaDate] : 05/24/2023 [TextBox_68] : NEGATIVE [HPVDate] : 05/24/2023 [TextBox_78] : NEGATIVE [HepatitisBDate] : 05/24/2023 [TextBox_83] : NEGATIVE [HepatitisCDate] : 05/24/2023 [TextBox_88] : NEGATIVE [LMPDate] : 05/28/2024 [PGHxTotal] : 0 [FreeTextEntry1] : 05/28/2024

## 2024-06-07 NOTE — PHYSICAL EXAM
[Chaperone Present] : A chaperone was present in the examining room during all aspects of the physical examination [70551] : A chaperone was present during the pelvic exam. [Appropriately responsive] : appropriately responsive [Alert] : alert [No Acute Distress] : no acute distress [Soft] : soft [Non-tender] : non-tender [Non-distended] : non-distended [Oriented x3] : oriented x3 [Examination Of The Breasts] : a normal appearance [No Discharge] : no discharge [No Masses] : no breast masses were palpable [Labia Majora] : normal [Labia Minora] : normal [No Bleeding] : There was no active vaginal bleeding [Normal] : normal [Uterine Adnexae] : normal

## 2024-06-07 NOTE — PLAN
[FreeTextEntry1] : 1. I recommended she start OTC prenatal vitamins as she will be trying to conceive within the next year, and she is not using any form of birth control.   2. I also recommended she start using PhD boric acid vaginal suppositories after her period to maintain a healthy vaginal ph.   3. Pap done today. If normal, will repeat in three years.   4. Declines all birth control options as well as full STI testing.   5. Self-breast exam reviewed.  She will follow up annually and as needed.

## 2024-06-07 NOTE — END OF VISIT
[FreeTextEntry3] : I, Eve Traore solely acted as scribe for Dr. Tayler Velasquez on 06/07/2024  All medical entries made by the Scribe were at my, Dr. Villalta, direction and personally dictated by me on 06/07/2024 . I have reviewed the chart and agree that the record accurately reflects my personal performance of the history, physical exam, assessment and plan. I have also personally directed, reviewed, and agreed with the chart.

## 2024-06-10 LAB — HPV HIGH+LOW RISK DNA PNL CVX: NOT DETECTED

## 2024-06-14 LAB — CYTOLOGY CVX/VAG DOC THIN PREP: ABNORMAL

## 2024-09-06 ENCOUNTER — APPOINTMENT (OUTPATIENT)
Dept: INTERNAL MEDICINE | Facility: CLINIC | Age: 34
End: 2024-09-06
Payer: COMMERCIAL

## 2024-09-06 VITALS
HEIGHT: 67 IN | HEART RATE: 80 BPM | TEMPERATURE: 207.68 F | DIASTOLIC BLOOD PRESSURE: 83 MMHG | BODY MASS INDEX: 25.74 KG/M2 | SYSTOLIC BLOOD PRESSURE: 124 MMHG | WEIGHT: 164 LBS | RESPIRATION RATE: 16 BRPM | OXYGEN SATURATION: 99 %

## 2024-09-06 DIAGNOSIS — R14.0 ABDOMINAL DISTENSION (GASEOUS): ICD-10-CM

## 2024-09-06 DIAGNOSIS — D35.2 BENIGN NEOPLASM OF PITUITARY GLAND: ICD-10-CM

## 2024-09-06 DIAGNOSIS — Z00.00 ENCOUNTER FOR GENERAL ADULT MEDICAL EXAMINATION W/OUT ABNORMAL FINDINGS: ICD-10-CM

## 2024-09-06 DIAGNOSIS — E28.2 POLYCYSTIC OVARIAN SYNDROME: ICD-10-CM

## 2024-09-06 DIAGNOSIS — Z80.3 FAMILY HISTORY OF MALIGNANT NEOPLASM OF BREAST: ICD-10-CM

## 2024-09-06 DIAGNOSIS — D50.9 IRON DEFICIENCY ANEMIA, UNSPECIFIED: ICD-10-CM

## 2024-09-06 DIAGNOSIS — Z76.89 PERSONS ENCOUNTERING HEALTH SERVICES IN OTHER SPECIFIED CIRCUMSTANCES: ICD-10-CM

## 2024-09-06 PROCEDURE — 99204 OFFICE O/P NEW MOD 45 MIN: CPT

## 2024-09-06 RX ORDER — FERROUS SULFATE 325(65) MG
TABLET ORAL
Refills: 0 | Status: ACTIVE | COMMUNITY

## 2024-09-06 RX ORDER — METFORMIN ER 750 MG 750 MG/1
750 TABLET ORAL
Refills: 0 | Status: ACTIVE | COMMUNITY

## 2024-09-08 PROBLEM — R79.89 ABNORMAL CBC: Status: ACTIVE | Noted: 2024-09-08

## 2024-09-08 LAB
25(OH)D3 SERPL-MCNC: 78.1 NG/ML
ALBUMIN SERPL ELPH-MCNC: 4.8 G/DL
ALP BLD-CCNC: 43 U/L
ALT SERPL-CCNC: 8 U/L
ANION GAP SERPL CALC-SCNC: 14 MMOL/L
APPEARANCE: CLEAR
AST SERPL-CCNC: 14 U/L
BASOPHILS # BLD AUTO: 0.04 K/UL
BASOPHILS NFR BLD AUTO: 0.4 %
BILIRUB SERPL-MCNC: 0.4 MG/DL
BILIRUBIN URINE: NEGATIVE
BLOOD URINE: NEGATIVE
BUN SERPL-MCNC: 9 MG/DL
CALCIUM SERPL-MCNC: 10 MG/DL
CHLORIDE SERPL-SCNC: 105 MMOL/L
CHOLEST SERPL-MCNC: 188 MG/DL
CO2 SERPL-SCNC: 22 MMOL/L
COLOR: YELLOW
CREAT SERPL-MCNC: 0.61 MG/DL
EGFR: 122 ML/MIN/1.73M2
EOSINOPHIL # BLD AUTO: 0.04 K/UL
EOSINOPHIL NFR BLD AUTO: 0.4 %
ESTIMATED AVERAGE GLUCOSE: 100 MG/DL
FERRITIN SERPL-MCNC: 18 NG/ML
FOLATE SERPL-MCNC: >20 NG/ML
GLUCOSE QUALITATIVE U: NEGATIVE MG/DL
GLUCOSE SERPL-MCNC: 92 MG/DL
HBA1C MFR BLD HPLC: 5.1 %
HCT VFR BLD CALC: 40.7 %
HDLC SERPL-MCNC: 81 MG/DL
HGB BLD-MCNC: 12.7 G/DL
IMM GRANULOCYTES NFR BLD AUTO: 0.2 %
IRON SATN MFR SERPL: 22 %
IRON SERPL-MCNC: 74 UG/DL
KETONES URINE: NEGATIVE MG/DL
LDLC SERPL CALC-MCNC: 97 MG/DL
LEUKOCYTE ESTERASE URINE: NEGATIVE
LYMPHOCYTES # BLD AUTO: 2 K/UL
LYMPHOCYTES NFR BLD AUTO: 18.8 %
MAN DIFF?: NORMAL
MCHC RBC-ENTMCNC: 29.2 PG
MCHC RBC-ENTMCNC: 31.2 GM/DL
MCV RBC AUTO: 93.6 FL
MONOCYTES # BLD AUTO: 0.61 K/UL
MONOCYTES NFR BLD AUTO: 5.7 %
NEUTROPHILS # BLD AUTO: 7.95 K/UL
NEUTROPHILS NFR BLD AUTO: 74.5 %
NITRITE URINE: NEGATIVE
NONHDLC SERPL-MCNC: 107 MG/DL
PH URINE: 7
PLATELET # BLD AUTO: 282 K/UL
POTASSIUM SERPL-SCNC: 4.5 MMOL/L
PROT SERPL-MCNC: 7.4 G/DL
PROTEIN URINE: NEGATIVE MG/DL
RBC # BLD: 4.35 M/UL
RBC # FLD: 13.3 %
SODIUM SERPL-SCNC: 141 MMOL/L
SPECIFIC GRAVITY URINE: 1.02
TIBC SERPL-MCNC: 334 UG/DL
TRIGL SERPL-MCNC: 53 MG/DL
TSH SERPL-ACNC: 1.69 UIU/ML
UIBC SERPL-MCNC: 260 UG/DL
UROBILINOGEN URINE: 0.2 MG/DL
VIT B12 SERPL-MCNC: 256 PG/ML
WBC # FLD AUTO: 10.66 K/UL

## 2024-09-11 ENCOUNTER — OUTPATIENT (OUTPATIENT)
Dept: OUTPATIENT SERVICES | Facility: HOSPITAL | Age: 34
LOS: 1 days | End: 2024-09-11
Payer: COMMERCIAL

## 2024-09-11 ENCOUNTER — APPOINTMENT (OUTPATIENT)
Dept: ULTRASOUND IMAGING | Facility: CLINIC | Age: 34
End: 2024-09-11
Payer: COMMERCIAL

## 2024-09-11 ENCOUNTER — OUTPATIENT (OUTPATIENT)
Dept: OUTPATIENT SERVICES | Facility: HOSPITAL | Age: 34
LOS: 1 days | End: 2024-09-11

## 2024-09-11 DIAGNOSIS — D50.9 IRON DEFICIENCY ANEMIA, UNSPECIFIED: ICD-10-CM

## 2024-09-11 DIAGNOSIS — Z00.8 ENCOUNTER FOR OTHER GENERAL EXAMINATION: ICD-10-CM

## 2024-09-11 PROCEDURE — 76700 US EXAM ABDOM COMPLETE: CPT

## 2024-09-11 PROCEDURE — 76700 US EXAM ABDOM COMPLETE: CPT | Mod: 26

## 2024-09-20 ENCOUNTER — APPOINTMENT (OUTPATIENT)
Dept: ENDOCRINOLOGY | Facility: CLINIC | Age: 34
End: 2024-09-20

## 2025-01-28 NOTE — PATIENT PROFILE ADULT - FOOD INSECURITY
Visit Information    Have you changed or started any medications since your last visit including any over-the-counter medicines, vitamins, or herbal medicines? no   Are you having any side effects from any of your medications? -  no  Have you stopped taking any of your medications? Is so, why? -  no    Have you seen any other physician or provider since your last visit? No  Have you had any other diagnostic tests since your last visit? No  Have you been seen in the emergency room and/or had an admission to a hospital since we last saw you? No  Have you had your routine dental cleaning in the past 6 months? no    Have you activated your [employer] account? If not, what are your barriers? Yes     Patient Care Team:  Keiry Pedersen PA-C as PCP - General (Physician Assistant)  Keiry Pedersen PA-C as PCP - Empaneled Provider    Medical History Review  Past Medical, Family, and Social History reviewed and  contribute to the patient presenting condition    Health Maintenance   Topic Date Due    Pneumococcal 0-64 years Vaccine (1 of 2 - PCV) Never done    Varicella vaccine (1 of 2 - 13+ 2-dose series) Never done    HIV screen  Never done    Hepatitis C screen  Never done    Hepatitis B vaccine (1 of 3 - 19+ 3-dose series) Never done    DTaP/Tdap/Td vaccine (1 - Tdap) Never done    Flu vaccine (1) Never done    COVID-19 Vaccine (1 - 2023-24 season) Never done    Depression Monitoring  02/07/2025    Hepatitis A vaccine  Aged Out    Hib vaccine  Aged Out    HPV vaccine  Aged Out    Polio vaccine  Aged Out    Meningococcal (ACWY) vaccine  Aged Out    Depression Screen  Discontinued    Diabetes screen  Discontinued        no